# Patient Record
Sex: MALE | URBAN - METROPOLITAN AREA
[De-identification: names, ages, dates, MRNs, and addresses within clinical notes are randomized per-mention and may not be internally consistent; named-entity substitution may affect disease eponyms.]

---

## 2022-11-20 ENCOUNTER — INPATIENT (INPATIENT)
Facility: HOSPITAL | Age: 87
LOS: 2 days | DRG: 82 | End: 2022-11-23
Attending: INTERNAL MEDICINE | Admitting: INTERNAL MEDICINE
Payer: MEDICARE

## 2022-11-20 VITALS — OXYGEN SATURATION: 98 % | HEART RATE: 104 BPM | RESPIRATION RATE: 24 BRPM | TEMPERATURE: 99 F | WEIGHT: 148.37 LBS

## 2022-11-20 DIAGNOSIS — I72.9 ANEURYSM OF UNSPECIFIED SITE: ICD-10-CM

## 2022-11-20 LAB
ALBUMIN SERPL ELPH-MCNC: 3.4 G/DL — SIGNIFICANT CHANGE UP (ref 3.3–5)
ALP SERPL-CCNC: 97 U/L — SIGNIFICANT CHANGE UP (ref 40–120)
ALT FLD-CCNC: 72 U/L — HIGH (ref 10–45)
ANION GAP SERPL CALC-SCNC: 11 MMOL/L — SIGNIFICANT CHANGE UP (ref 5–17)
APTT BLD: 26.4 SEC — LOW (ref 27.5–35.5)
AST SERPL-CCNC: 91 U/L — HIGH (ref 10–40)
BILIRUB SERPL-MCNC: 0.6 MG/DL — SIGNIFICANT CHANGE UP (ref 0.2–1.2)
BLD GP AB SCN SERPL QL: NEGATIVE — SIGNIFICANT CHANGE UP
BUN SERPL-MCNC: 30 MG/DL — HIGH (ref 7–23)
CALCIUM SERPL-MCNC: 7.7 MG/DL — LOW (ref 8.4–10.5)
CHLORIDE SERPL-SCNC: 101 MMOL/L — SIGNIFICANT CHANGE UP (ref 96–108)
CHOLEST SERPL-MCNC: 125 MG/DL — SIGNIFICANT CHANGE UP
CO2 SERPL-SCNC: 20 MMOL/L — LOW (ref 22–31)
CREAT SERPL-MCNC: 1.38 MG/DL — HIGH (ref 0.5–1.3)
EGFR: 40 ML/MIN/1.73M2 — LOW
GAS PNL BLDA: SIGNIFICANT CHANGE UP
GLUCOSE SERPL-MCNC: 184 MG/DL — HIGH (ref 70–99)
HCT VFR BLD CALC: 37 % — LOW (ref 39–50)
HDLC SERPL-MCNC: 67 MG/DL — SIGNIFICANT CHANGE UP
HGB BLD-MCNC: 12 G/DL — LOW (ref 13–17)
INR BLD: 1.1 RATIO — SIGNIFICANT CHANGE UP (ref 0.88–1.16)
LIPID PNL WITH DIRECT LDL SERPL: 50 MG/DL — SIGNIFICANT CHANGE UP
MAGNESIUM SERPL-MCNC: 1.9 MG/DL — SIGNIFICANT CHANGE UP (ref 1.6–2.6)
MCHC RBC-ENTMCNC: 29.5 PG — SIGNIFICANT CHANGE UP (ref 27–34)
MCHC RBC-ENTMCNC: 32.4 GM/DL — SIGNIFICANT CHANGE UP (ref 32–36)
MCV RBC AUTO: 90.9 FL — SIGNIFICANT CHANGE UP (ref 80–100)
NON HDL CHOLESTEROL: 58 MG/DL — SIGNIFICANT CHANGE UP
NRBC # BLD: 0 /100 WBCS — SIGNIFICANT CHANGE UP (ref 0–0)
PHOSPHATE SERPL-MCNC: 2.8 MG/DL — SIGNIFICANT CHANGE UP (ref 2.5–4.5)
PLATELET # BLD AUTO: 191 K/UL — SIGNIFICANT CHANGE UP (ref 150–400)
POTASSIUM SERPL-MCNC: 4.5 MMOL/L — SIGNIFICANT CHANGE UP (ref 3.5–5.3)
POTASSIUM SERPL-SCNC: 4.5 MMOL/L — SIGNIFICANT CHANGE UP (ref 3.5–5.3)
PROT SERPL-MCNC: 6 G/DL — SIGNIFICANT CHANGE UP (ref 6–8.3)
PROTHROM AB SERPL-ACNC: 12.7 SEC — SIGNIFICANT CHANGE UP (ref 10.5–13.4)
RBC # BLD: 4.07 M/UL — LOW (ref 4.2–5.8)
RBC # FLD: 14.4 % — SIGNIFICANT CHANGE UP (ref 10.3–14.5)
RH IG SCN BLD-IMP: POSITIVE — SIGNIFICANT CHANGE UP
SODIUM SERPL-SCNC: 132 MMOL/L — LOW (ref 135–145)
TRIGL SERPL-MCNC: 43 MG/DL — SIGNIFICANT CHANGE UP
TROPONIN T, HIGH SENSITIVITY RESULT: 238 NG/L — HIGH (ref 0–51)
WBC # BLD: 17.73 K/UL — HIGH (ref 3.8–10.5)
WBC # FLD AUTO: 17.73 K/UL — HIGH (ref 3.8–10.5)

## 2022-11-20 PROCEDURE — 71045 X-RAY EXAM CHEST 1 VIEW: CPT | Mod: 26

## 2022-11-20 PROCEDURE — 99291 CRITICAL CARE FIRST HOUR: CPT

## 2022-11-20 PROCEDURE — 93010 ELECTROCARDIOGRAM REPORT: CPT

## 2022-11-20 RX ORDER — CHLORHEXIDINE GLUCONATE 213 G/1000ML
1 SOLUTION TOPICAL AT BEDTIME
Refills: 0 | Status: DISCONTINUED | OUTPATIENT
Start: 2022-11-20 | End: 2022-11-23

## 2022-11-20 RX ORDER — SODIUM CHLORIDE 9 MG/ML
1000 INJECTION INTRAMUSCULAR; INTRAVENOUS; SUBCUTANEOUS
Refills: 0 | Status: DISCONTINUED | OUTPATIENT
Start: 2022-11-20 | End: 2022-11-22

## 2022-11-20 RX ORDER — CHLORHEXIDINE GLUCONATE 213 G/1000ML
15 SOLUTION TOPICAL EVERY 12 HOURS
Refills: 0 | Status: DISCONTINUED | OUTPATIENT
Start: 2022-11-20 | End: 2022-11-23

## 2022-11-20 RX ORDER — LACOSAMIDE 50 MG/1
50 TABLET ORAL EVERY 12 HOURS
Refills: 0 | Status: DISCONTINUED | OUTPATIENT
Start: 2022-11-20 | End: 2022-11-21

## 2022-11-20 RX ORDER — PANTOPRAZOLE SODIUM 20 MG/1
40 TABLET, DELAYED RELEASE ORAL DAILY
Refills: 0 | Status: DISCONTINUED | OUTPATIENT
Start: 2022-11-20 | End: 2022-11-23

## 2022-11-20 RX ORDER — PHENYLEPHRINE HYDROCHLORIDE 10 MG/ML
0.2 INJECTION INTRAVENOUS
Qty: 40 | Refills: 0 | Status: DISCONTINUED | OUTPATIENT
Start: 2022-11-20 | End: 2022-11-21

## 2022-11-20 RX ADMIN — PANTOPRAZOLE SODIUM 40 MILLIGRAM(S): 20 TABLET, DELAYED RELEASE ORAL at 23:19

## 2022-11-20 RX ADMIN — PHENYLEPHRINE HYDROCHLORIDE 5.05 MICROGRAM(S)/KG/MIN: 10 INJECTION INTRAVENOUS at 23:20

## 2022-11-20 NOTE — CONSULT NOTE ADULT - SUBJECTIVE AND OBJECTIVE BOX
p (1480)     HPI: 80 male with a past medical history of hypothyroidism (no AC/AP) s/p a fall down stairs today, was intubated in field for unresponsiveness, transferred from [facility] after CTH showed subarachnoid hemorrhage of unknown (trauma vs. aneurysmal). Upon transfer he was on propofol for sedation.     Imaging: [facility] diffuse subarachnoid hemorrhage, no significant hydrocephalus, multiple skull and orbit fractures R>L w/ displacement and intracranial pneumocephalus.     Exam: Intubated, no EO, L pupil 4R, R pupil 3NR, no corneals,+cough/gag, not FC< spont x4    --Anticoagulation:    =====================  PAST MEDICAL HISTORY     PAST SURGICAL HISTORY         MEDICATIONS:  Antibiotics:    Neuro:  lacosamide IVPB 50 milliGRAM(s) IV Intermittent every 12 hours    Other:  pantoprazole  Injectable 40 milliGRAM(s) IV Push daily      SOCIAL HISTORY:   Occupation:   Marital Status:     FAMILY HISTORY:      ROS: Negative except per HPI    LABS:  PT/INR - ( 20 Nov 2022 21:43 )   PT: 12.7 sec;   INR: 1.10 ratio         PTT - ( 20 Nov 2022 21:43 )  PTT:26.4 sec                        12.0   17.73 )-----------( 191      ( 20 Nov 2022 21:43 )             37.0     11-20    132<L>  |  101  |  30<H>  ----------------------------<  184<H>  4.5   |  20<L>  |  1.38<H>    Ca    7.7<L>      20 Nov 2022 21:43  Phos  2.8     11-20  Mg     1.9     11-20    TPro  6.0  /  Alb  3.4  /  TBili  0.6  /  DBili  x   /  AST  91<H>  /  ALT  72<H>  /  AlkPhos  97  11-20

## 2022-11-20 NOTE — CONSULT NOTE ADULT - SUBJECTIVE AND OBJECTIVE BOX
TRAUMA SERVICE (Acute Care Surgery / ACS - #9063) - CONSULT NOTE  --------------------------------------------------------------------------------------------    TRAUMA ACTIVATION LEVEL:     MECHANISM OF INJURY:      [] Blunt  	[] MVC	[X] Fall	[] Pedestrian Struck	[] Motorcycle accident      [] Penetrating  	[] Gun Shot Wound 		[] Stab Wound    GCS: 7T          HPI:  89yo M found at the bottom of stairs (reportedly fall down 12 stairs), patient not on any anticoagulants/antiplatelets, brought to [facility], intubated for unresponsiveness, reportedly as SAH on CTH, neurosurgeon and radiologist concerned for aneurysmal SAH pattern, requested transfer to NSCU from [facility]. Information obtained by daughter Brianda ([phone]). Reportedly per EMS, in [facility] ED, received 1U pRBC and 60g mannitol. Patient on propofol for sedation.    Primary Survey:   A - Intubated  B - bilateral breath sounds, mechanical ventilation 18/5/450/40%  C - initial BP: 95/67, off pressors  D - GCS 7T on arrival  Exposure obtained      Secondary Survey:   General: NAD  HEENT:  b/l periorbital bruising, in C collar  Neck: Soft, midline trachea, C-collar in place, deformity over L clavicle  Chest: Deformity over L lower chest/anterior ribs  Cardiac: Regular rate  Respiratory: bilateral breath sounds, mechanical ventilation 18/5/450/40%  Abdomen: Soft, non-distended, non-tender, no rebound, no guarding   Pelvis: Stable, non-tender, no ecchymosis  Ext: Palpable pulses b/l    ROS: 10-system review is otherwise negative except HPI above.      PAST MEDICAL & SURGICAL HISTORY:  Hypothyroidism  Afib (No Anticoagulants/No antiplatelets        FAMILY HISTORY:        ALLERGIES: No Known Allergies      HOME MEDICATIONS: Pending Med rec    CURRENT MEDICATIONS  MEDICATIONS (STANDING): lacosamide IVPB 50 milliGRAM(s) IV Intermittent every 12 hours  pantoprazole  Injectable 40 milliGRAM(s) IV Push daily  phenylephrine    Infusion 0.2 MICROgram(s)/kG/Min IV Continuous <Continuous>  sodium chloride 0.9%. 1000 milliLiter(s) IV Continuous <Continuous>    MEDICATIONS (PRN):  --------------------------------------------------------------------------------------------     Vital Signs Last 24 Hrs  T(C): 37.1 (20 Nov 2022 20:53), Max: 37.1 (20 Nov 2022 20:53)  T(F): 98.8 (20 Nov 2022 20:53), Max: 98.8 (20 Nov 2022 20:53)  HR: 98 (20 Nov 2022 22:30) (98 - 110)  BP: 95/67 (20 Nov 2022 22:15) (84/59 - 123/74)  BP(mean): 77 (20 Nov 2022 22:15) (69 - 90)  RR: 20 (20 Nov 2022 22:30) (12 - 27)  SpO2: 100% (20 Nov 2022 22:30) (96% - 100%)    Parameters below as of 20 Nov 2022 20:53  Patient On (Oxygen Delivery Method): ventilator    O2 Concentration (%): 80      CAPILLARY BLOOD GLUCOSE      Vital Signs Last 24 Hrs  T(C): 37.1 (20 Nov 2022 20:53), Max: 37.1 (20 Nov 2022 20:53)  T(F): 98.8 (20 Nov 2022 20:53), Max: 98.8 (20 Nov 2022 20:53)  HR: 98 (20 Nov 2022 22:30) (98 - 110)  BP: 95/67 (20 Nov 2022 22:15) (84/59 - 123/74)  BP(mean): 77 (20 Nov 2022 22:15) (69 - 90)  RR: 20 (20 Nov 2022 22:30) (12 - 27)  SpO2: 100% (20 Nov 2022 22:30) (96% - 100%)    Parameters below as of 20 Nov 2022 20:53  Patient On (Oxygen Delivery Method): ventilator    O2 Concentration (%): 80    Weight (kg): 67.3 (11-20 @ 20:53)    PHYSICAL EXAM:  General: NAD  HEENT:  b/l periorbital bruising, in C collar  Neck: Soft, midline trachea, C-collar in place, deformity over L clavicle  Chest: Deformity over L lower chest/anterior ribs  Cardiac: Regular rate  Respiratory: bilateral breath sounds, mechanical ventilation 18/5/450/40%  Abdomen: Soft, non-distended, non-tender, no rebound, no guarding   Pelvis: Stable, non-tender, no ecchymosis  Ext: Palpable pulses b/l    --------------------------------------------------------------------------------------------    LABS  CBC (11-20 @ 21:43)                              12.0<L>                         17.73<H>  )----------------(  191        --    % Neutrophils, --    % Lymphocytes, ANC: --                                  37.0<L>    BMP (11-20 @ 21:43)             132<L>  |  101     |  30<H> 		Ca++ --      Ca 7.7<L>             ---------------------------------( 184<H>		Mg 1.9                4.5     |  20<L>   |  1.38<H>			Ph 2.8       LFTs (11-20 @ 21:43)      TPro 6.0 / Alb 3.4 / TBili 0.6 / DBili -- / AST 91<H> / ALT 72<H> / AlkPhos 97    Coags (11-20 @ 21:43)  aPTT 26.4<L> / INR 1.10 / PT 12.7          --------------------------------------------------------------------------------------------    MICROBIOLOGY      --------------------------------------------------------------------------------------------    IMAGING  Pending    --------------------------------------------------------------------------------------------     TRAUMA SERVICE (Acute Care Surgery / [facility] - #9074) - CONSULT NOTE  --------------------------------------------------------------------------------------------    TRAUMA ACTIVATION LEVEL:     MECHANISM OF INJURY:      [] Blunt  	[] MVC	[X] Fall	[] Pedestrian Struck	[] Motorcycle accident      [] Penetrating  	[] Gun Shot Wound 		[] Stab Wound    GCS: 7T          HPI:  89yo M found at the bottom of stairs (reportedly fall down 12 stairs), patient not on any anticoagulants/antiplatelets, brought to [facility], intubated for unresponsiveness, reportedly as SAH on CTH, neurosurgeon and radiologist concerned for aneurysmal SAH pattern, requested transfer to NSCU from [facility]. Information obtained by daughter Brianda ([phone]). Reportedly per EMS, in [facility] ED, received 1U pRBC and 60g mannitol. Patient on propofol for sedation.    Primary Survey:   A - Intubated  B - bilateral breath sounds, mechanical ventilation 18/5/450/40%  C - initial BP: 95/67, off pressors  D - GCS 7T on arrival  Exposure obtained      Secondary Survey:   General: NAD  HEENT:  b/l periorbital bruising, in C collar  Neck: Soft, midline trachea, C-collar in place, deformity over L clavicle  Chest: Deformity over L lower chest/anterior ribs  Cardiac: Regular rate  Respiratory: bilateral breath sounds, mechanical ventilation 18/5/450/40%  Abdomen: Soft, non-distended, non-tender, no rebound, no guarding   Pelvis: Stable, non-tender, no ecchymosis  Ext: Palpable pulses b/l    ROS: 10-system review is otherwise negative except HPI above.      PAST MEDICAL & SURGICAL HISTORY:  Hypothyroidism  Afib (No Anticoagulants/No antiplatelets        FAMILY HISTORY:        ALLERGIES: No Known Allergies      HOME MEDICATIONS: Pending Med rec    CURRENT MEDICATIONS  MEDICATIONS (STANDING): lacosamide IVPB 50 milliGRAM(s) IV Intermittent every 12 hours  pantoprazole  Injectable 40 milliGRAM(s) IV Push daily  phenylephrine    Infusion 0.2 MICROgram(s)/kG/Min IV Continuous <Continuous>  sodium chloride 0.9%. 1000 milliLiter(s) IV Continuous <Continuous>    MEDICATIONS (PRN):  --------------------------------------------------------------------------------------------     Vital Signs Last 24 Hrs  T(C): 37.1 (20 Nov 2022 20:53), Max: 37.1 (20 Nov 2022 20:53)  T(F): 98.8 (20 Nov 2022 20:53), Max: 98.8 (20 Nov 2022 20:53)  HR: 98 (20 Nov 2022 22:30) (98 - 110)  BP: 95/67 (20 Nov 2022 22:15) (84/59 - 123/74)  BP(mean): 77 (20 Nov 2022 22:15) (69 - 90)  RR: 20 (20 Nov 2022 22:30) (12 - 27)  SpO2: 100% (20 Nov 2022 22:30) (96% - 100%)    Parameters below as of 20 Nov 2022 20:53  Patient On (Oxygen Delivery Method): ventilator    O2 Concentration (%): 80      CAPILLARY BLOOD GLUCOSE      Vital Signs Last 24 Hrs  T(C): 37.1 (20 Nov 2022 20:53), Max: 37.1 (20 Nov 2022 20:53)  T(F): 98.8 (20 Nov 2022 20:53), Max: 98.8 (20 Nov 2022 20:53)  HR: 98 (20 Nov 2022 22:30) (98 - 110)  BP: 95/67 (20 Nov 2022 22:15) (84/59 - 123/74)  BP(mean): 77 (20 Nov 2022 22:15) (69 - 90)  RR: 20 (20 Nov 2022 22:30) (12 - 27)  SpO2: 100% (20 Nov 2022 22:30) (96% - 100%)    Parameters below as of 20 Nov 2022 20:53  Patient On (Oxygen Delivery Method): ventilator    O2 Concentration (%): 80    Weight (kg): 67.3 (11-20 @ 20:53)    PHYSICAL EXAM:  General: NAD  HEENT:  b/l periorbital bruising, in C collar  Neck: Soft, midline trachea, C-collar in place, deformity over L clavicle  Chest: Deformity over L lower chest/anterior ribs  Cardiac: Regular rate  Respiratory: bilateral breath sounds, mechanical ventilation 18/5/450/40%  Abdomen: Soft, non-distended, non-tender, no rebound, no guarding   Pelvis: Stable, non-tender, no ecchymosis  Ext: Palpable pulses b/l    --------------------------------------------------------------------------------------------    LABS  CBC (11-20 @ 21:43)                              12.0<L>                         17.73<H>  )----------------(  191        --    % Neutrophils, --    % Lymphocytes, ANC: --                                  37.0<L>    BMP (11-20 @ 21:43)             132<L>  |  101     |  30<H> 		Ca++ --      Ca 7.7<L>             ---------------------------------( 184<H>		Mg 1.9                4.5     |  20<L>   |  1.38<H>			Ph 2.8       LFTs (11-20 @ 21:43)      TPro 6.0 / Alb 3.4 / TBili 0.6 / DBili -- / AST 91<H> / ALT 72<H> / AlkPhos 97    Coags (11-20 @ 21:43)  aPTT 26.4<L> / INR 1.10 / PT 12.7          --------------------------------------------------------------------------------------------    MICROBIOLOGY      --------------------------------------------------------------------------------------------    IMAGING    ******PRELIMINARY REPORT******      ******PRELIMINARY REPORT******       ACC: [id number] EXAM:  CT CHEST IC                          PROCEDURE DATE:  11/21/2022    ******PRELIMINARY REPORT******      ******PRELIMINARY REPORT******           INTERPRETATION:  No emergent findings in the chest, abd, pelvis. Follow   up full report in AM        ******PRELIMINARY REPORT******      ******PRELIMINARY REPORT******        SUZANNE SOARES MD; Resident Radiologist  This document is a PRELIMINARY interpretation and is pending final   attending approval. Nov 21 2022  4:19AM    --------------------------------------------------------------------------------------------

## 2022-11-20 NOTE — PROGRESS NOTE ADULT - ASSESSMENT
ASSESSMENT/PLAN:    NEURO:  Activity: [] mobilize as tolerated [] Bedrest [] PT [] OT [] PMNR    PULM:    CV:  SBP goal    RENAL:  Fluids:    GI:  Diet:  GI prophylaxis [] not indicated [] PPI [] other:  Bowel regimen [] colace [] senna [] other:    ENDO:   Goal euglycemia (-180)    HEME/ONC:  VTE prophylaxis: [] SCDs [] chemoprophylaxis [] hold chemoprophylaxis due to: [] high risk of DVT/PE on admission due to:    ID:    MISC:    SOCIAL/FAMILY:  [] awaiting [] updated at bedside [] family meeting    CODE STATUS:  [] Full Code [] DNR [] DNI [] Palliative/Comfort Care    DISPOSITION:  [] ICU [] Stroke Unit [] Floor [] EMU [] RCU [] PCU    [] Patient is at high risk of neurologic deterioration/death due to:     Time seen:  Time spent: ___ [] critical care minutes    Contact: [phone] ASSESSMENT/PLAN: presumed trauma, found at the bottom of stairs    NEURO:  needs repeat CTH/CTA now once renal function back  vimpat 50mg bid for seizure ppx  CT Cspine  C collar at all times for now  Activity: [] mobilize as tolerated [x] Bedrest [] PT [] OT [] PMNR    PULM:  intubated, CXR, ABG  18/450/5/40%    CV:  SBP goal 100-160 for now, need f/u scans to assess for traumatic SAH vs. aneurysmal   EKG LBBB  TTE pending  check trops     RENAL:  Fluids: IVF     GI:  Diet: NPO   GI prophylaxis [] not indicated [x] PPI [] other:  Bowel regimen [] colace [x] senna [x] other: miraalx     ENDO:   Goal euglycemia (-180)  TFT  reportedly hx hypothyroid, will need to resume synthroid     HEME/ONC:  VTE prophylaxis: [x] SCDs [] chemoprophylaxis [x] hold chemoprophylaxis due to:  SAH [] high risk of DVT/PE on admission due to:    ID: monitor for fevers     MISC: trauma consult, CT C/A/P for eval ?reported fall down the stairs    SOCIAL/FAMILY:  [x] awaiting [] updated at bedside [] family meeting    CODE STATUS:  [x] Full Code [] DNR [] DNI [] Palliative/Comfort Care    DISPOSITION:  [x] ICU [] Stroke Unit [] Floor [] EMU [] RCU [] PCU    [x] Patient is at high risk of neurologic deterioration/death due to: respiratory failure requiring intubation, SAH     Time spent: 50 critical care minutes    Contact: [phone] ASSESSMENT/PLAN: presumed trauma, found at the bottom of stairs    NEURO:  needs repeat CTH/CTA now once renal function back  vimpat 50mg bid for seizure ppx  CT Cspine  C collar at all times for now  Activity: [] mobilize as tolerated [x] Bedrest [] PT [] OT [] PMNR    PULM:  intubated, CXR, ABG  18/450/5/40%    CV:  SBP goal 100-140 for now, need f/u scans to assess for traumatic SAH vs. aneurysmal   EKG LBBB  TTE pending  check trops     RENAL:  Fluids: IVF     GI:  Diet: NPO   GI prophylaxis [] not indicated [x] PPI [] other:  Bowel regimen [] colace [x] senna [x] other: miraalx     ENDO:   Goal euglycemia (-180)  TFT  reportedly hx hypothyroid, will need to resume synthroid     HEME/ONC:  VTE prophylaxis: [x] SCDs [] chemoprophylaxis [x] hold chemoprophylaxis due to:  SAH [] high risk of DVT/PE on admission due to:    ID: monitor for fevers     MISC: trauma consult, CT C/A/P for eval ?reported fall down the stairs    SOCIAL/FAMILY:  [x] awaiting [] updated at bedside [] family meeting    CODE STATUS:  [x] Full Code [] DNR [] DNI [] Palliative/Comfort Care    DISPOSITION:  [x] ICU [] Stroke Unit [] Floor [] EMU [] RCU [] PCU    [x] Patient is at high risk of neurologic deterioration/death due to: respiratory failure requiring intubation, SAH     Time spent: 50 critical care minutes    Contact: [phone] ASSESSMENT/PLAN: presumed trauma, found at the bottom of stairs    NEURO:  needs repeat CTH/CTA now once renal function back  vimpat 50mg bid for seizure ppx  CT Cspine  C collar at all times for now  will need vEEG once scans done   Activity: [] mobilize as tolerated [x] Bedrest [] PT [] OT [] PMNR    PULM:  intubated, CXR, ABG  18/450/5/40%    CV:  SBP goal 100-140 for now, need f/u scans to assess for traumatic SAH vs. aneurysmal   EKG LBBB  TTE pending  check trops     RENAL:  Fluids: IVF     GI:  Diet: NPO   GI prophylaxis [] not indicated [x] PPI [] other:  Bowel regimen [] colace [x] senna [x] other: miraalx     ENDO:   Goal euglycemia (-180)  TFT  reportedly hx hypothyroid, will need to resume synthroid     HEME/ONC:  VTE prophylaxis: [x] SCDs [] chemoprophylaxis [x] hold chemoprophylaxis due to:  SAH [] high risk of DVT/PE on admission due to:    ID: monitor for fevers     MISC: trauma consult, CT C/A/P for eval ?reported fall down the stairs    SOCIAL/FAMILY:  [x] awaiting [] updated at bedside [] family meeting    CODE STATUS:  [x] Full Code [] DNR [] DNI [] Palliative/Comfort Care    DISPOSITION:  [x] ICU [] Stroke Unit [] Floor [] EMU [] RCU [] PCU    [x] Patient is at high risk of neurologic deterioration/death due to: respiratory failure requiring intubation, SAH     Time spent: 50 critical care minutes    Contact: [phone]

## 2022-11-20 NOTE — CONSULT NOTE ADULT - ASSESSMENT
89yo M found at the bottom of stairs (reportedly fall down 12 stairs), patient not on any anticoagulants/antiplatelets, brought to [facility], intubated for unresponsiveness, reportedly as SAH on CTH, neurosurgeon and radiologist concerned for aneurysmal SAH pattern, requested transfer to NSCU from [facility]. Information obtained by daughter Brianda ([phone]). Reportedly per EMS, in [facility] ED, received 1U pRBC and 60g mannitol. Patient on propofol for sedation.      Plan  - CTA scan Head/Neck and CT chest/abdomen/pelvis pending  - Trend H/H, transfuse as needed  - Monitor vital signs  - Will follow  - Discussed with senior resident    ACS Surgery  [id number]    89yo M found at the bottom of stairs (reportedly fall down 12 stairs), patient not on any anticoagulants/antiplatelets, brought to [facility], intubated for unresponsiveness, reportedly as SAH on CTH, neurosurgeon and radiologist concerned for aneurysmal SAH pattern, requested transfer to NSCU from [facility]. Information obtained by daughter Brianda ([phone]). Reportedly per EMS, in [facility] ED, received 1U pRBC and 60g mannitol. Patient on propofol for sedation.      Plan  - CT scan Head noncon/ CT angio head/ CT angio Neck and CT chest/abdomen/pelvis pending  - Trend H/H, transfuse as needed  - Monitor vital signs  - Will follow  - Discussed with Surgery Attending on call Dr. Miller    ACS Surgery  [id number]    89yo M found at the bottom of stairs (reportedly fall down 12 stairs), patient not on any anticoagulants/antiplatelets, brought to [facility], intubated for unresponsiveness, reportedly as SAH on CTH, neurosurgeon and radiologist concerned for aneurysmal SAH pattern, requested transfer to NSCU from [facility]. Information obtained by daughter Brianda ([phone]). Reportedly per EMS, in [facility] ED, received 1U pRBC and 60g mannitol. Patient on propofol for sedation.      Plan  - No acute surgical intervention  - CT scan Head noncon/ CT angio head/ CT angio Neck and CT chest/abdomen/pelvis f/u final read, prelim report without emergent findings from chest/abdomen/pelvis  - Neurosurgery recommendations appreciated, per NSG family elected not to proceed with ICP monitor/drain according to previous living will despite neuro exam changes (dilated fixed pupils)  - f/u C discussion  - Trend H/H, transfuse as needed  - Monitor vital signs  - Will follow  - Discussed with senior resident and with Surgery Attending on call Dr. Miller    ACS Surgery  [id number]

## 2022-11-20 NOTE — PATIENT PROFILE ADULT - VISION (WITH CORRECTIVE LENSES IF THE PATIENT USUALLY WEARS THEM):
Vaccine Information Sheet, \"Influenza - Inactivated\"  given to Martha Bradshaw, or parent/legal guardian of  Martha Bradshaw and verbalized understanding. Patient responses:    Have you ever had a reaction to a flu vaccine? No  Do you have any current illness? No  Have you ever had [place]illian [place] Syndrome? No  Do you have a serious allergy to any of the follow: Neomycin, Polymyxin, Thimerosal, eggs or egg products? No    Flu vaccine given per order. Please see immunization tab. Risks and benefits explained. Current VIS given. Normal vision: sees adequately in most situations; can see medication labels, newsprint

## 2022-11-20 NOTE — CONSULT NOTE ADULT - ASSESSMENT
80M pmhx hypothyroid s/p fall down stairs no ac/ap, intubated in field, xferred from [facility]. CTH 5pm traumatic vs aneurysmal SAH. Weaning off propofol, pupils 3NR b/l, no corneals, + cough/gag, spont x4  -in nscu, admitted to neurointensivist  -repeat CTH w/ CTA pending  -will discuss goals of care with family. If no improvement in exam with propofol held will likely require intracranial pressure monitoring for trauma guidelines

## 2022-11-20 NOTE — PROGRESS NOTE ADULT - SUBJECTIVE AND OBJECTIVE BOX
SUMMARY:    OVERNIGHT EVENTS:     ADMISSION SCORES:   GCS: HH: MF: NIHSS: ICH Score:    SEDATION SCORES:  RASS: CAM-ICU:     REVIEW OF SYSTEMS:    VITALS: [] Reviewed    IMAGING/DATA: [] Reviewed    IVF FLUIDS/MEDICATIONS: [] Reviewed    ALLERGIES: Allergies      Intolerances        DEVICES:   [] Restraints [] PIVs [] ET tube [] central line [] PICC [] arterial line [] nash [] NGT/OGT [] EVD [] LD [] JAYDON/HMV [] LiCOX [] ICP monitor [] Trach [] PEG [] Chest Tube [] other:    EXAMINATION:  General: No acute distress  HEENT: Anicteric sclerae  Cardiac: [id number]  Lungs: Clear  Abdomen: Soft, non-tender, +BS  Extremities: No c/c/e  Skin/Incision Site: Clean, dry and intact  Neurologic: Awake, alert, fully oriented, follows commands, PERRL, VFFtc, EOMI, face symmetric, tongue midline, no drift, full strength SUMMARY: Reportedly 89yo man found at the bottom of stairs, brought to [facility], intubated, reportedly as SAH on CTH, neurosurgeon and radiologist concerned for aneurysmal SAH pattern, requested transfer. Daughter on her way to [facility] now, Brianda [phone]. Reportedly per EMS, in [facility] ED, received 1U pRBC and 60g mannitol.     ADMISSION SCORES: needs CTH, was sent with no written reports   GCS: 7T HH: MF: NIHSS: ICH Score:    REVIEW OF SYSTEMS: [x] Unable to Assess due to neurologic exam   [ ] All ROS addressed below are non-contributory, except:  Neuro: [ ] Headache [ ] Back pain [ ] Numbness [ ] Weakness [ ] Ataxia [ ] Dizziness [ ] Aphasia [ ] Dysarthria [ ] Visual disturbance  Resp: [ ] Shortness of breath/dyspnea [ ] Orthopnea [ ] Cough  CV: [ ] Chest pain [ ] Palpitation [ ] Lightheadedness [ ] Syncope  Renal: [ ] Thirst [ ] Edema  GI: [ ] Nausea [ ] Emesis [ ] Abdominal pain [ ] Constipation [ ] Diarrhea  Hem: [ ] Hematemesis [ ] bBright red blood per rectum  ID: [ ] Fever [ ] Chills [ ] Dysuria  ENT: [ ] Rhinorrhea    VITALS: [x] Reviewed    IMAGING/DATA: [x] Reviewed    IVF FLUIDS/MEDICATIONS: [x] Reviewed    ALLERGIES: Allergies    Intolerances    DEVICES:   [] Restraints [x] PIVs [x] ET tube [] central line [] PICC [x] arterial line [x] nash [x] NGT/OGT [] EVD [] LD [] JAYDON/HMV [] LiCOX [] ICP monitor [] Trach [] PEG [] Chest Tube [] other:    EXAMINATION:  General: No acute distress  HEENT: Anicteric sclerae  Cardiac: [id number]  Lungs: Clear  Abdomen: Soft, non-tender, +BS  Extremities: No c/c/e  Neurologic: b/l periorbital bruising, in C collar, pupils 3mm NR, no EO to stim, RUE spont/AG, LUE no movement, RLE AG to stim, LLE no movement  SUMMARY: Reportedly 89yo man found at the bottom of stairs, brought to [facility], intubated, reportedly as SAH on CTH, neurosurgeon and radiologist concerned for aneurysmal SAH pattern, requested transfer. Daughter on her way to [facility] now, Brianda [phone]. Reportedly per EMS, in [facility] ED, received 1U pRBC and 60g mannitol.     ADMISSION SCORES: needs CTH, was sent with no written reports   GCS: 7T HH: 4 MF: 4 NIHSS: ICH Score:    REVIEW OF SYSTEMS: [x] Unable to Assess due to neurologic exam   [ ] All ROS addressed below are non-contributory, except:  Neuro: [ ] Headache [ ] Back pain [ ] Numbness [ ] Weakness [ ] Ataxia [ ] Dizziness [ ] Aphasia [ ] Dysarthria [ ] Visual disturbance  Resp: [ ] Shortness of breath/dyspnea [ ] Orthopnea [ ] Cough  CV: [ ] Chest pain [ ] Palpitation [ ] Lightheadedness [ ] Syncope  Renal: [ ] Thirst [ ] Edema  GI: [ ] Nausea [ ] Emesis [ ] Abdominal pain [ ] Constipation [ ] Diarrhea  Hem: [ ] Hematemesis [ ] bBright red blood per rectum  ID: [ ] Fever [ ] Chills [ ] Dysuria  ENT: [ ] Rhinorrhea    VITALS: [x] Reviewed    IMAGING/DATA: [x] Reviewed    IVF FLUIDS/MEDICATIONS: [x] Reviewed    ALLERGIES: Allergies    Intolerances    DEVICES:   [] Restraints [x] PIVs [x] ET tube [] central line [] PICC [x] arterial line [x] nash [x] NGT/OGT [] EVD [] LD [] JAYDON/HMV [] LiCOX [] ICP monitor [] Trach [] PEG [] Chest Tube [] other:    EXAMINATION:  General: No acute distress  HEENT: Anicteric sclerae  Cardiac: [id number]  Lungs: Clear  Abdomen: Soft, non-tender, +BS  Extremities: No c/c/e  Neurologic: b/l periorbital bruising, in C collar, pupils 3mm NR, no EO to stim, RUE spont/AG, LUE no movement, RLE AG to stim, LLE no movement

## 2022-11-21 DIAGNOSIS — G93.40 ENCEPHALOPATHY, UNSPECIFIED: ICD-10-CM

## 2022-11-21 DIAGNOSIS — R53.2 FUNCTIONAL QUADRIPLEGIA: ICD-10-CM

## 2022-11-21 DIAGNOSIS — J96.01 ACUTE RESPIRATORY FAILURE WITH HYPOXIA: ICD-10-CM

## 2022-11-21 DIAGNOSIS — I60.9 NONTRAUMATIC SUBARACHNOID HEMORRHAGE, UNSPECIFIED: ICD-10-CM

## 2022-11-21 DIAGNOSIS — Z71.89 OTHER SPECIFIED COUNSELING: ICD-10-CM

## 2022-11-21 DIAGNOSIS — R09.89 OTHER SPECIFIED SYMPTOMS AND SIGNS INVOLVING THE CIRCULATORY AND RESPIRATORY SYSTEMS: ICD-10-CM

## 2022-11-21 LAB
A1C WITH ESTIMATED AVERAGE GLUCOSE RESULT: 6.2 % — HIGH (ref 4–5.6)
ANION GAP SERPL CALC-SCNC: 10 MMOL/L — SIGNIFICANT CHANGE UP (ref 5–17)
ANION GAP SERPL CALC-SCNC: 11 MMOL/L — SIGNIFICANT CHANGE UP (ref 5–17)
ANION GAP SERPL CALC-SCNC: 9 MMOL/L — SIGNIFICANT CHANGE UP (ref 5–17)
BUN SERPL-MCNC: 30 MG/DL — HIGH (ref 7–23)
BUN SERPL-MCNC: 35 MG/DL — HIGH (ref 7–23)
BUN SERPL-MCNC: 37 MG/DL — HIGH (ref 7–23)
CALCIUM SERPL-MCNC: 8 MG/DL — LOW (ref 8.4–10.5)
CALCIUM SERPL-MCNC: 8.4 MG/DL — SIGNIFICANT CHANGE UP (ref 8.4–10.5)
CALCIUM SERPL-MCNC: 8.6 MG/DL — SIGNIFICANT CHANGE UP (ref 8.4–10.5)
CHLORIDE SERPL-SCNC: 109 MMOL/L — HIGH (ref 96–108)
CHLORIDE SERPL-SCNC: 116 MMOL/L — HIGH (ref 96–108)
CHLORIDE SERPL-SCNC: 120 MMOL/L — HIGH (ref 96–108)
CO2 SERPL-SCNC: 19 MMOL/L — LOW (ref 22–31)
CO2 SERPL-SCNC: 21 MMOL/L — LOW (ref 22–31)
CO2 SERPL-SCNC: 23 MMOL/L — SIGNIFICANT CHANGE UP (ref 22–31)
CREAT SERPL-MCNC: 1.48 MG/DL — HIGH (ref 0.5–1.3)
CREAT SERPL-MCNC: 1.99 MG/DL — HIGH (ref 0.5–1.3)
CREAT SERPL-MCNC: 2.1 MG/DL — HIGH (ref 0.5–1.3)
EGFR: 30 ML/MIN/1.73M2 — LOW
EGFR: 32 ML/MIN/1.73M2 — LOW
EGFR: 37 ML/MIN/1.73M2 — LOW
ESTIMATED AVERAGE GLUCOSE: 131 MG/DL — HIGH (ref 68–114)
GLUCOSE SERPL-MCNC: 160 MG/DL — HIGH (ref 70–99)
GLUCOSE SERPL-MCNC: 191 MG/DL — HIGH (ref 70–99)
GLUCOSE SERPL-MCNC: 193 MG/DL — HIGH (ref 70–99)
HCT VFR BLD CALC: 32.5 % — LOW (ref 39–50)
HGB BLD-MCNC: 10.4 G/DL — LOW (ref 13–17)
MAGNESIUM SERPL-MCNC: 1.9 MG/DL — SIGNIFICANT CHANGE UP (ref 1.6–2.6)
MAGNESIUM SERPL-MCNC: 2.2 MG/DL — SIGNIFICANT CHANGE UP (ref 1.6–2.6)
MAGNESIUM SERPL-MCNC: 2.3 MG/DL — SIGNIFICANT CHANGE UP (ref 1.6–2.6)
MCHC RBC-ENTMCNC: 29.3 PG — SIGNIFICANT CHANGE UP (ref 27–34)
MCHC RBC-ENTMCNC: 32 GM/DL — SIGNIFICANT CHANGE UP (ref 32–36)
MCV RBC AUTO: 91.5 FL — SIGNIFICANT CHANGE UP (ref 80–100)
NRBC # BLD: 0 /100 WBCS — SIGNIFICANT CHANGE UP (ref 0–0)
OSMOLALITY SERPL: 331 MOSMOL/KG — HIGH (ref 280–301)
PHOSPHATE SERPL-MCNC: 1.4 MG/DL — LOW (ref 2.5–4.5)
PHOSPHATE SERPL-MCNC: 4.3 MG/DL — SIGNIFICANT CHANGE UP (ref 2.5–4.5)
PLATELET # BLD AUTO: 167 K/UL — SIGNIFICANT CHANGE UP (ref 150–400)
POTASSIUM SERPL-MCNC: 4 MMOL/L — SIGNIFICANT CHANGE UP (ref 3.5–5.3)
POTASSIUM SERPL-MCNC: 4.1 MMOL/L — SIGNIFICANT CHANGE UP (ref 3.5–5.3)
POTASSIUM SERPL-MCNC: 4.4 MMOL/L — SIGNIFICANT CHANGE UP (ref 3.5–5.3)
POTASSIUM SERPL-SCNC: 4 MMOL/L — SIGNIFICANT CHANGE UP (ref 3.5–5.3)
POTASSIUM SERPL-SCNC: 4.1 MMOL/L — SIGNIFICANT CHANGE UP (ref 3.5–5.3)
POTASSIUM SERPL-SCNC: 4.4 MMOL/L — SIGNIFICANT CHANGE UP (ref 3.5–5.3)
RBC # BLD: 3.55 M/UL — LOW (ref 4.2–5.8)
RBC # FLD: 15.4 % — HIGH (ref 10.3–14.5)
SARS-COV-2 RNA SPEC QL NAA+PROBE: SIGNIFICANT CHANGE UP
SODIUM SERPL-SCNC: 139 MMOL/L — SIGNIFICANT CHANGE UP (ref 135–145)
SODIUM SERPL-SCNC: 147 MMOL/L — HIGH (ref 135–145)
SODIUM SERPL-SCNC: 152 MMOL/L — HIGH (ref 135–145)
T3 SERPL-MCNC: 75 NG/DL — LOW (ref 80–200)
T4 AB SER-ACNC: 7.6 UG/DL — SIGNIFICANT CHANGE UP (ref 4.6–12)
TROPONIN T, HIGH SENSITIVITY RESULT: 480 NG/L — HIGH (ref 0–51)
TROPONIN T, HIGH SENSITIVITY RESULT: 527 NG/L — HIGH (ref 0–51)
TROPONIN T, HIGH SENSITIVITY RESULT: 730 NG/L — HIGH (ref 0–51)
TROPONIN T, HIGH SENSITIVITY RESULT: 763 NG/L — HIGH (ref 0–51)
TSH SERPL-MCNC: 2.34 UIU/ML — SIGNIFICANT CHANGE UP (ref 0.27–4.2)
WBC # BLD: 20.11 K/UL — HIGH (ref 3.8–10.5)
WBC # FLD AUTO: 20.11 K/UL — HIGH (ref 3.8–10.5)

## 2022-11-21 PROCEDURE — 99223 1ST HOSP IP/OBS HIGH 75: CPT

## 2022-11-21 PROCEDURE — 99291 CRITICAL CARE FIRST HOUR: CPT

## 2022-11-21 PROCEDURE — 95720 EEG PHY/QHP EA INCR W/VEEG: CPT

## 2022-11-21 PROCEDURE — 71260 CT THORAX DX C+: CPT | Mod: 26

## 2022-11-21 PROCEDURE — 70496 CT ANGIOGRAPHY HEAD: CPT | Mod: 26

## 2022-11-21 PROCEDURE — 99497 ADVNCD CARE PLAN 30 MIN: CPT | Mod: 25

## 2022-11-21 PROCEDURE — 93970 EXTREMITY STUDY: CPT | Mod: 26

## 2022-11-21 PROCEDURE — 70498 CT ANGIOGRAPHY NECK: CPT | Mod: 26

## 2022-11-21 PROCEDURE — 74177 CT ABD & PELVIS W/CONTRAST: CPT | Mod: 26

## 2022-11-21 RX ORDER — NICARDIPINE HYDROCHLORIDE 30 MG/1
5 CAPSULE, EXTENDED RELEASE ORAL
Qty: 40 | Refills: 0 | Status: DISCONTINUED | OUTPATIENT
Start: 2022-11-21 | End: 2022-11-21

## 2022-11-21 RX ORDER — NOREPINEPHRINE BITARTRATE/D5W 8 MG/250ML
0.05 PLASTIC BAG, INJECTION (ML) INTRAVENOUS
Qty: 8 | Refills: 0 | Status: DISCONTINUED | OUTPATIENT
Start: 2022-11-21 | End: 2022-11-21

## 2022-11-21 RX ORDER — SODIUM CHLORIDE 9 MG/ML
30 INJECTION INTRAMUSCULAR; INTRAVENOUS; SUBCUTANEOUS ONCE
Refills: 0 | Status: COMPLETED | OUTPATIENT
Start: 2022-11-21 | End: 2022-11-21

## 2022-11-21 RX ORDER — LEVOTHYROXINE SODIUM 125 MCG
125 TABLET ORAL DAILY
Refills: 0 | Status: DISCONTINUED | OUTPATIENT
Start: 2022-11-21 | End: 2022-11-22

## 2022-11-21 RX ORDER — NOREPINEPHRINE BITARTRATE/D5W 8 MG/250ML
0.05 PLASTIC BAG, INJECTION (ML) INTRAVENOUS
Qty: 16 | Refills: 0 | Status: DISCONTINUED | OUTPATIENT
Start: 2022-11-21 | End: 2022-11-21

## 2022-11-21 RX ORDER — POLYETHYLENE GLYCOL 3350 17 G/17G
17 POWDER, FOR SOLUTION ORAL EVERY 12 HOURS
Refills: 0 | Status: DISCONTINUED | OUTPATIENT
Start: 2022-11-21 | End: 2022-11-23

## 2022-11-21 RX ORDER — LEVETIRACETAM 250 MG/1
250 TABLET, FILM COATED ORAL
Refills: 0 | Status: DISCONTINUED | OUTPATIENT
Start: 2022-11-21 | End: 2022-11-23

## 2022-11-21 RX ORDER — CHLORHEXIDINE GLUCONATE 213 G/1000ML
1 SOLUTION TOPICAL
Refills: 0 | Status: DISCONTINUED | OUTPATIENT
Start: 2022-11-21 | End: 2022-11-21

## 2022-11-21 RX ORDER — PHENYLEPHRINE HYDROCHLORIDE 10 MG/ML
0.2 INJECTION INTRAVENOUS
Qty: 40 | Refills: 0 | Status: DISCONTINUED | OUTPATIENT
Start: 2022-11-21 | End: 2022-11-21

## 2022-11-21 RX ORDER — SODIUM CHLORIDE 9 MG/ML
10 INJECTION INTRAMUSCULAR; INTRAVENOUS; SUBCUTANEOUS
Refills: 0 | Status: DISCONTINUED | OUTPATIENT
Start: 2022-11-21 | End: 2022-11-21

## 2022-11-21 RX ORDER — NOREPINEPHRINE BITARTRATE/D5W 8 MG/250ML
0.05 PLASTIC BAG, INJECTION (ML) INTRAVENOUS
Qty: 16 | Refills: 0 | Status: DISCONTINUED | OUTPATIENT
Start: 2022-11-21 | End: 2022-11-22

## 2022-11-21 RX ORDER — ATORVASTATIN CALCIUM 80 MG/1
40 TABLET, FILM COATED ORAL AT BEDTIME
Refills: 0 | Status: DISCONTINUED | OUTPATIENT
Start: 2022-11-21 | End: 2022-11-21

## 2022-11-21 RX ORDER — FENTANYL CITRATE 50 UG/ML
50 INJECTION INTRAVENOUS
Refills: 0 | Status: DISCONTINUED | OUTPATIENT
Start: 2022-11-21 | End: 2022-11-22

## 2022-11-21 RX ORDER — SODIUM,POTASSIUM PHOSPHATES 278-250MG
1 POWDER IN PACKET (EA) ORAL ONCE
Refills: 0 | Status: COMPLETED | OUTPATIENT
Start: 2022-11-21 | End: 2022-11-21

## 2022-11-21 RX ORDER — MANNITOL
50 POWDER (GRAM) MISCELLANEOUS ONCE
Refills: 0 | Status: COMPLETED | OUTPATIENT
Start: 2022-11-21 | End: 2022-11-21

## 2022-11-21 RX ORDER — SENNA PLUS 8.6 MG/1
15 TABLET ORAL AT BEDTIME
Refills: 0 | Status: DISCONTINUED | OUTPATIENT
Start: 2022-11-21 | End: 2022-11-23

## 2022-11-21 RX ADMIN — LACOSAMIDE 110 MILLIGRAM(S): 50 TABLET ORAL at 05:27

## 2022-11-21 RX ADMIN — Medication 63.75 MILLIMOLE(S): at 05:27

## 2022-11-21 RX ADMIN — Medication 3.16 MICROGRAM(S)/KG/MIN: at 19:36

## 2022-11-21 RX ADMIN — FENTANYL CITRATE 50 MICROGRAM(S): 50 INJECTION INTRAVENOUS at 18:40

## 2022-11-21 RX ADMIN — PANTOPRAZOLE SODIUM 40 MILLIGRAM(S): 20 TABLET, DELAYED RELEASE ORAL at 12:06

## 2022-11-21 RX ADMIN — CHLORHEXIDINE GLUCONATE 1 APPLICATION(S): 213 SOLUTION TOPICAL at 05:27

## 2022-11-21 RX ADMIN — POLYETHYLENE GLYCOL 3350 17 GRAM(S): 17 POWDER, FOR SOLUTION ORAL at 17:41

## 2022-11-21 RX ADMIN — CHLORHEXIDINE GLUCONATE 1 APPLICATION(S): 213 SOLUTION TOPICAL at 21:39

## 2022-11-21 RX ADMIN — FENTANYL CITRATE 50 MICROGRAM(S): 50 INJECTION INTRAVENOUS at 19:55

## 2022-11-21 RX ADMIN — Medication 3.16 MICROGRAM(S)/KG/MIN: at 07:51

## 2022-11-21 RX ADMIN — SENNA PLUS 15 MILLILITER(S): 8.6 TABLET ORAL at 21:30

## 2022-11-21 RX ADMIN — SODIUM CHLORIDE 30 MILLILITER(S): 9 INJECTION INTRAMUSCULAR; INTRAVENOUS; SUBCUTANEOUS at 00:45

## 2022-11-21 RX ADMIN — SODIUM CHLORIDE 75 MILLILITER(S): 9 INJECTION INTRAMUSCULAR; INTRAVENOUS; SUBCUTANEOUS at 07:51

## 2022-11-21 RX ADMIN — LEVETIRACETAM 250 MILLIGRAM(S): 250 TABLET, FILM COATED ORAL at 17:40

## 2022-11-21 RX ADMIN — Medication 3.16 MICROGRAM(S)/KG/MIN: at 05:26

## 2022-11-21 RX ADMIN — SODIUM CHLORIDE 75 MILLILITER(S): 9 INJECTION INTRAMUSCULAR; INTRAVENOUS; SUBCUTANEOUS at 19:37

## 2022-11-21 RX ADMIN — SODIUM CHLORIDE 75 MILLILITER(S): 9 INJECTION INTRAMUSCULAR; INTRAVENOUS; SUBCUTANEOUS at 03:44

## 2022-11-21 RX ADMIN — Medication 1 APPLICATION(S): at 13:10

## 2022-11-21 RX ADMIN — Medication 1 PACKET(S): at 05:29

## 2022-11-21 RX ADMIN — Medication 1 APPLICATION(S): at 21:30

## 2022-11-21 RX ADMIN — PHENYLEPHRINE HYDROCHLORIDE 5.05 MICROGRAM(S)/KG/MIN: 10 INJECTION INTRAVENOUS at 03:44

## 2022-11-21 RX ADMIN — CHLORHEXIDINE GLUCONATE 15 MILLILITER(S): 213 SOLUTION TOPICAL at 05:26

## 2022-11-21 RX ADMIN — Medication 1500 GRAM(S): at 01:00

## 2022-11-21 RX ADMIN — CHLORHEXIDINE GLUCONATE 15 MILLILITER(S): 213 SOLUTION TOPICAL at 17:40

## 2022-11-21 NOTE — CONSULT NOTE ADULT - PROBLEM SELECTOR RECOMMENDATION 9
No surgical intervention per family request  Catastrophic event with no likelihood for any functional or meaningful recovery  HH4 upon arrival

## 2022-11-21 NOTE — DISCHARGE NOTE NURSING/CASE MANAGEMENT/SOCIAL WORK - NSDCPEFALRISK_GEN_ALL_CORE
For information on Fall & Injury Prevention, visit: [email or web address] OR  [email or web address] OR  https://www.cdc.gov/steadi/patient.html

## 2022-11-21 NOTE — H&P ADULT - NSHPPHYSICALEXAM_GEN_ALL_CORE
Intubated bilateral eye ecchymosis, no eye opening, pupils 3mm non reactive, not following commands, Right side spontaneous and antigravity, Lt side nothing.  1 hour later in CT pt was moving LUE spontaneously

## 2022-11-21 NOTE — ADVANCED PRACTICE NURSE CONSULT - ASSESSMENT
The pt was encountered in the NSCU- Mr Valencia was intubated, unresponsive, on vasopressors for hemodynamic instability. He has a  cervical collar in place and is being turned and positioned with spinal precautions. Staff are off-loading his heels with a william-lock positioner. As he was a/w a pressure injury, a nutrition consult is pending for further evaluation.  Upon assessment, the pt presents with an area of intact skin over the sacrum and b/l buttocks that presents with a change in color tone that is different form the rest of the pts skin. Will classify as a deep tissue injury present on admission. this is the  most likely etiology  given the pts hx of fall and presentation today. Also., a deep tissue injury can take up to 72 hours to manifest. this area measures 8cm x8cm x0cm.  As per discussion with the primary nurse, a Palliative Care consult is pending.

## 2022-11-21 NOTE — CHART NOTE - NSCHARTNOTEFT_GEN_A_CORE
89M PMHx CAD, hypothyroidism s/p fall down 10-12 stairs and found down, intubated in field s/p mannitol at OSH, CTH showing diffuse thick subarachnoid hemorrhage and multiple skull/facial fractures, CTA showing aneurysm.     Neurosurgery resident notified of patient's change in pupil exam from 3mm and fixed to bilateral fixed/dilated pupils consistent with worsening intracranial pressure secondary to sustained intracranial hemorrhagic insults from traumatic and/or aneurysmal hemorrhage. Prior to exam change, daughter Brianda made aware of most likely progression of neurologic pathology and possible paths for management. Risks and benefits of interventions including comfort care, maximum medical management, intracranial pressure monitor, and external ventricular drain, along with likely progression with no intervention, were explained and acknowledged. In accordance with patient's previous living will which described DNR/DNI orders, patient's daughter elected not to proceed with ICP monitor or EVD both before and after exam change.     Medical management was continued with IV mannitol, hypertonic saline.    Further goals of care discussions pending.

## 2022-11-21 NOTE — H&P ADULT - HISTORY OF PRESENT ILLNESS
89 year old male with PMHx of hypothyroidism presented from OSH s/p trauma fall down stairs.  Pt's daughter says that he she fell down 10-12 stairs and was found at the bottom.  Pt was taken to [facility] and CT scan showed extensive SAH. Pt was transferred to NSCU for further management.

## 2022-11-21 NOTE — H&P ADULT - ASSESSMENT
89 year old male with PMHx of hypothyroidism presented from OSH s/p trauma fall down stairs.  Pt's daughter says that he she fell down 10-12 stairs and was found at the bottom.  Pt was taken to [facility] and CT scan showed extensive SAH. Pt was transferred to NSCU for further management.  Also found to have a basilar skull fracture.

## 2022-11-21 NOTE — CHART NOTE - NSCHARTNOTEFT_GEN_A_CORE
EEG preliminary read (not final) on the initial recording hour(s) = x 2    No seizures recorded.  Severe slowing noted, nonspecific.    [facility] EEG Reading Room Ph#: [phone]  Epilepsy Answering Service after 5PM and before 8:30AM: Ph#: [phone]

## 2022-11-21 NOTE — CHART NOTE - NSCHARTNOTEFT_GEN_A_CORE
Patient seen and examined at bedside. No acute surgical intervention indicated. Trauma surg to sign off, please reconsult as needed.

## 2022-11-21 NOTE — CHART NOTE - NSCHARTNOTEFT_GEN_A_CORE
CAPRINI SCORE [CLOT] Score on Admission for     AGE RELATED RISK FACTORS                                                       MOBILITY RELATED FACTORS  [ ] Age 41-60 years                                            (1 Point)                  [ ] Bed rest                                                        (1 Point)  [ ] Age: 61-74 years                                           (2 Points)                 [ ] Plaster cast                                                   (2 Points)  [x ] Age= 75 years                                              (3 Points)                 [ ] Bed bound for more than 72 hours                 (2 Points)    DISEASE RELATED RISK FACTORS                                               GENDER SPECIFIC FACTORS  [ ] Edema in the lower extremities                       (1 Point)                  [ ] Pregnancy                                                     (1 Point)  [ ] Varicose veins                                               (1 Point)                  [ ] Post-partum < 6 weeks                                   (1 Point)             [ ] BMI > 25 Kg/m2                                            (1 Point)                  [ ] Hormonal therapy  or oral contraception          (1 Point)                 [ ] Sepsis (in the previous month)                        (1 Point)                  [ ] History of pregnancy complications                 (1 point)  [ ] Pneumonia or serious lung disease                                               [ ] Unexplained or recurrent                     (1 Point)           (in the previous month)                               (1 Point)  [ ] Abnormal pulmonary function test                     (1 Point)                 SURGERY RELATED RISK FACTORS (include planned surgeries)  [ ] Acute myocardial infarction                              (1 Point)                 [ ]  Section                                             (1 Point)  [ ] Congestive heart failure (in the previous month)  (1 Point)         [ ] Minor surgery                                                  (1 Point)   [ ] Inflammatory bowel disease                             (1 Point)                 [ ] Arthroscopic surgery                                        (2 Points)  [ ] Central venous access                                      (2 Points)                [ ] General surgery lasting more than 45 minutes   (2 Points)       [x ] Stroke (in the previous month)                          (5 Points)               [ ] Elective arthroplasty                                         (5 Points)            [ ] current or past malignancy                              (2 Points)                                                                                                       HEMATOLOGY RELATED FACTORS                                                 TRAUMA RELATED RISK FACTORS  [ ] Prior episodes of VTE                                     (3 Points)                [ ] Fracture of the hip, pelvis, or leg                       (5 Points)  [ ] Positive family history for VTE                         (3 Points)                 [ ] Acute spinal cord injury (in the previous month)  (5 Points)  [ ] Prothrombin 53225 A                                     (3 Points)                 [ ] Paralysis  (less than 1 month)                             (5 Points)  [ ] Factor V Leiden                                             (3 Points)                  [ ] Multiple Trauma within 1 month                        (5 Points)  [ ] Lupus anticoagulants                                     (3 Points)                                                           [ ] Anticardiolipin antibodies                               (3 Points)                                                       [ ] High homocysteine in the blood                      (3 Points)                                             [ ] Other congenital or acquired thrombophilia      (3 Points)                                                [ ] Heparin induced thrombocytopenia                  (3 Points)                                          Total Score [     8     ]    Risk:  Very low 0   Low 1 to 2   Moderate 3 to 4   High =5       VTE Prophylasix Recommednations:  [x ] mechanical pneumatic compression devices                                      [ ] contraindicated: _____________________  [ ] chemo prophylasix                                                                                   [ x] contraindicated _____________________    **** HIGH LIKELIHOOD DVT PRESENT ON ADMISSION  [ x] (please order LE dopplers within 24 hours of admission)

## 2022-11-21 NOTE — CONSULT NOTE ADULT - ASSESSMENT
89 y/o male found at the bottom of staircase at home , transferred to [facility], intubated, [facility] with extensive facial and skull fractures with HH4 MF4 SAH.  Palliative care called for advance care planning and goals of care

## 2022-11-21 NOTE — ADVANCED PRACTICE NURSE CONSULT - REASON FOR CONSULT
Requested by staff to assess skin status of pt a/w a pressure injury. PMH is noted:  89 year old male with PMHx of hypothyroidism presented from OSH s/p trauma fall down stairs.  Pt's daughter says that he she fell down 10-12 stairs and was found at the bottom.  Pt was taken to [facility] and CT scan showed extensive SAH. Pt was transferred to NSCU for further management.

## 2022-11-21 NOTE — DISCHARGE NOTE NURSING/CASE MANAGEMENT/SOCIAL WORK - PATIENT PORTAL LINK FT
2021       Enrique Simms MD  [address]  [place] [address]  [facility] [address]  Via In Basket      Patient: Kaylah Lawrence   YOB: 2002   Date of Visit: 10/5/2021       Dear Dr. Simms:    Thank you for referring kaylah Lawrence to me for evaluation. Below are my notes for this visit with her.    If you have questions, please do not hesitate to call me. I look forward to following your patient along with you.      Sincerely,        Gretchen Ibrahim CNP        CC: No Recipients  Gretchen Ibrahim CNP  10/5/2021  3:10 PM  Signed  OFFICE VISIT      Patient: Kaylah Lawrence Date of Service: 10/5/2021   : 2002 MRN: [id number]     SUBJECTIVE:   HISTORY OF PRESENT ILLNESS:  Kaylah Lawrence is a 18 year old female who presents today for FU abd pain, N/V. Saw in May and July of this year.   Seen in [facility] ED previously for same. CT with IV contrast showed non-specific RLQ mesenteric lymphadenopathy, otherwise normal.   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 UGI xray then without any noted obstruction- xray read as possible \"slight\" extrinsic pressure on right lateral aspect of upper to mid segment of esophagus. Pt denies any dysphagia. She had EGD 2021 with Dr. Fair- normal with gastric BX showing rare h.pylori- treated with amox/clarithro/ppi     FH- No known IBD or CRC  PMH- denies  SH- attending [facility] in , then to [facility]. No smoking. No ETOH, no marijuana. Lives with parents     Today: ABD pain now resolved. States n/v is less frequent and smaller quantity. She finished her amox/clarithromycin/PPI for h.pylori. constipation is well managed- no complaints.       PAST MEDICAL HISTORY:  Past Medical History:   Diagnosis Date   • Abdominal pain    • Concussion    • Emesis        MEDICATIONS:  Current Outpatient Medications   Medication Sig   • omeprazole (PriLOSEC) 40 MG capsule Take 1 capsule by mouth daily. Indications: Infection caused by  Helicobacter Pylori Bacteria   • clarithromycin (BIAXIN) 500 MG tablet Take 1 tablet by mouth 2 times daily. Indications: h pylori infection   • ondansetron (ZOFRAN ODT) 4 MG disintegrating tablet Place 1 tablet onto the tongue every 8 hours as needed for Nausea.     No current facility-administered medications for this visit.       ALLERGIES:  ALLERGIES:   Allergen Reactions   • Acetazolamide DIZZINESS     After using it, had a headache and my stomach was upset       PAST SURGICAL HISTORY:  Past Surgical History:   Procedure Laterality Date   • Egd  08/18/2021    Marv       FAMILY HISTORY:  Family History   Problem Relation Age of Onset   • Diabetes Father    • Diabetes Paternal Grandmother    • Diabetes Paternal Grandfather        SOCIAL HISTORY:  Social History     Tobacco Use   • Smoking status: Never Smoker   • Smokeless tobacco: Never Used   Vaping Use   • Vaping Use: Every day   • Substances: Nicotine   Substance Use Topics   • Alcohol use: No   • Drug use: No       ROS:   Gen: no fatigue, fevers, malaise  HEENT: no dysphagia/odynophagia  CV: no chest pain/sob, no palpitations   Resp: no sob, cough   GI: see HPI  Heme: no bleeding/bruising   : no dysuria or hematuria  MS: no joint pain, swelling   Neuro: no parasthesias, weakness, dizziness or syncope  Derm: no rash, skin changes, jaundice  Psych: no sadness, depression, anxiety, changes in mentation      OBJECTIVE:     Visit Vitals  /85 (BP Location: LUE - Left upper extremity, Patient Position: Sitting, Cuff Size: Regular)   Pulse 80   Temp 98 °F (36.7 °C) (Core)   Ht 5' 6\" (1.676 m)   Wt 87.9 kg (193 lb 12.6 oz)   LMP 08/14/2021   BMI 31.28 kg/m²       PE:   Gen: AOx3, NAD. Well nourished  HEENT: pupils equal with EOM, anicteric, pink conjunctiva, neck is supple. No cervical lymphadenopathy. MM are pink and moist  CV: rrr, no murmur appreciated. No LE edema noted  Resp: cta bilaterally. No resp distress.  Abd: +bs, soft, NT/ND, no HSM, no  masses.   Extr: HUERTA   MS: nl gait and strength  Neuro: no FND   Derm: no rashes or skin lesions  Psych: Mood appropriate. Cooperative.     DIAGNOSTIC STUDIES:   LAB RESULTS    Hospital Outpatient Visit on 08/18/2021   Component Date Value Ref Range Status   • URINE PREGNANCY,QUAL 08/18/2021 Negative  Negative Final   • Internal Procedural Controls Accep* 08/18/2021 Yes   Final   • Case Report 08/18/2021    Final                    Value:Surgical Pathology                                Case: [id number]                                  Authorizing Provider:  Ondina Fair MD   Collected:           08/18/2021 1131              Ordering Location:     [facility]    Received:            08/18/2021 1343                                     GASTROENTEROLOGY                                                             Pathologist:           Alethea Balderas MD                                                        Specimens:   A) - Small Intestine, Duodenum, duodenal biopsies,r/o Celiac                                        B) - Stomach, gastric antrum and body, r/o h pylori                                       • Pathologic Diagnosis 08/18/2021    Final                    Value:This result contains rich text formatting which cannot be displayed here.   • Clinical Information 08/18/2021    Final                    Value:This result contains rich text formatting which cannot be displayed here.   • Gross Description 08/18/2021    Final                    Value:This result contains rich text formatting which cannot be displayed here.   • Disclaimer 08/18/2021    Final                    Value:This result contains rich text formatting which cannot be displayed here.   Lab Services on 08/15/2021   Component Date Value Ref Range Status   • SARS-CoV-2 by PCR 08/15/2021 Not Detected  Not Detected / Detected / Inhibitor Present Final   • Isolation Guidelines 08/15/2021    Final                    Value:This  result contains rich text formatting which cannot be displayed here.   • Procedural Notes 08/15/2021    Final                    Value:This result contains rich text formatting which cannot be displayed here.   Lab Services on 05/25/2021   Component Date Value Ref Range Status   • Fasting Status 05/25/2021 0  Hours Final   • Sodium 05/25/2021 138  135 - 145 mmol/L Final   • Potassium 05/25/2021 3.9  3.4 - 5.1 mmol/L Final   • Chloride 05/25/2021 107  98 - 107 mmol/L Final   • Carbon Dioxide 05/25/2021 25  21 - 32 mmol/L Final   • Anion Gap 05/25/2021 10  10 - 20 mmol/L Final   • Glucose 05/25/2021 96  65 - 99 mg/dL Final   • BUN 05/25/2021 9  6 - 20 mg/dL Final   • Creatinine 05/25/2021 0.69  0.51 - 0.95 mg/dL Final   • Glomerular Filtration Rate 05/25/2021 >90  >90 mL/min/1.73m2 Final    eGFR results = or >90 mL/min/1.73m2 = Normal kidney function.   • BUN/ Creatinine Ratio 05/25/2021 13  7 - 25 Final   • Calcium 05/25/2021 9.4  8.4 - 10.2 mg/dL Final   • Bilirubin, Total 05/25/2021 0.7  0.2 - 1.0 mg/dL Final   • GOT/AST 05/25/2021 11  <=37 Units/L Final   • GPT/ALT 05/25/2021 21  6 - 35 Units/L Final   • Alkaline Phosphatase 05/25/2021 83  42 - 110 Units/L Final   • Albumin 05/25/2021 3.9  3.6 - 5.1 g/dL Final   • Protein, Total 05/25/2021 7.3  6.4 - 8.2 g/dL Final   • Globulin 05/25/2021 3.4  2.0 - 4.0 g/dL Final   • A/G Ratio 05/25/2021 1.1  1.0 - 2.4 Final   • C-Reactive Protein 05/25/2021 0.6  <=1.0 mg/dL Final   • WBC 05/25/2021 8.8  4.2 - 11.0 K/mcL Final   • RBC 05/25/2021 4.92  4.00 - 5.20 mil/mcL Final   • HGB 05/25/2021 13.6  12.0 - 15.5 g/dL Final   • HCT 05/25/2021 42.8  36.0 - 46.5 % Final   • MCV 05/25/2021 87.0  78.0 - 100.0 fl Final   • MCH 05/25/2021 27.6  26.0 - 34.0 pg Final   • MCHC 05/25/2021 31.8* 32.0 - 36.5 g/dL Final   • RDW-CV 05/25/2021 12.4  11.0 - 15.0 % Final   • RDW-SD 05/25/2021 39.2  39.0 - 50.0 fL Final   • PLT 05/25/2021 291  140 - 450 K/mcL Final   • NRBC 05/25/2021 0  <=0 /100  WBC Final   • Neutrophil, Percent 05/25/2021 60  % Final   • Lymphocytes, Percent 05/25/2021 33  % Final   • Mono, Percent 05/25/2021 6  % Final   • Eosinophils, Percent 05/25/2021 1  % Final   • Basophils, Percent 05/25/2021 0  % Final   • Immature Granulocytes 05/25/2021 0  % Final   • Absolute Neutrophils 05/25/2021 5.2  1.8 - 8.0 K/mcL Final   • Absolute Lymphocytes 05/25/2021 2.9  1.2 - 5.2 K/mcL Final   • Absolute Monocytes 05/25/2021 0.6  0.3 - 0.9 K/mcL Final   • Absolute Eosinophils  05/25/2021 0.1  0.0 - 0.5 K/mcL Final   • Absolute Basophils 05/25/2021 0.0  0.0 - 0.3 K/mcL Final   • Absolute Immmature Granulocytes 05/25/2021 0.0  0.0 - 0.2 K/mcL Final   Office Visit on 04/12/2021   Component Date Value Ref Range Status   • COLOR, URINALYSIS 04/12/2021 Yellow   Final   • APPEARANCE, URINALYSIS 04/12/2021 Clear   Final   • GLUCOSE, URINALYSIS 04/12/2021 Negative  Negative mg/dL Final   • BILIRUBIN, URINALYSIS 04/12/2021 Negative  Negative Final   • KETONES, URINALYSIS 04/12/2021 Negative  Negative mg/dL Final   • SPECIFIC GRAVITY, URINALYSIS 04/12/2021 1.029  1.005 - 1.030 Final   • OCCULT BLOOD, URINALYSIS 04/12/2021 Small* Negative Final   • PH, URINALYSIS 04/12/2021 6.0  5.0 - 7.0 Final   • PROTEIN, URINALYSIS 04/12/2021 Negative  Negative mg/dL Final   • UROBILINOGEN, URINALYSIS 04/12/2021 0.2  0.2, 1.0 mg/dL Final   • NITRITE, URINALYSIS 04/12/2021 Negative  Negative Final   • LEUKOCYTE ESTERASE, URINALYSIS 04/12/2021 Negative  Negative Final   • SQUAMOUS EPITHELIAL, URINALYSIS 04/12/2021 1 to 5  None Seen, 1 to 5 /hpf Final   • ERYTHROCYTES, URINALYSIS 04/12/2021 1 to 2  None Seen, 1 to 2 /hpf Final   • LEUKOCYTES, URINALYSIS 04/12/2021 1 to 5  None Seen, 1 to 5 /hpf Final   • BACTERIA, URINALYSIS 04/12/2021 None Seen  None Seen /hpf Final   • HYALINE CASTS, URINALYSIS 04/12/2021 None Seen  None Seen, 1 to 5 /lpf Final   • MUCUS 04/12/2021 Present   Final       TSH (mcUnits/mL)   Date Value    12/22/2020 0.814         Lab Results Reviewed and Diagnostic Data Reviewed    ASSESSMENT AND PLAN:   Kaylah Lawrence is a 18 year old female who presents today for FU abd pain, N/V. Saw in May and July of this year.   Seen in [facility] ED previously for same. CT with IV contrast showed non-specific RLQ mesenteric lymphadenopathy, otherwise normal.   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 UGI xray then without any noted obstruction- xray read as possible \"slight\" extrinsic pressure on right lateral aspect of upper to mid segment of esophagus. Pt denies any dysphagia. She had EGD 8/18/2021 with Dr. Fair- normal with gastric BX showing rare h.pylori- treated with amox/clarithro/ppi    1. H.pylori with normal findings on EGD- discussed with pt  - no noted extrinsic pressure on esophagus as questions on UGI xray  - pt completed course of abx  - check stool sample in 4-5 weeks    2. No further abd pain, but still with some N/V, but this has decreased in frequency and quantity  - instructed pt to f/u with PCP if N/V worsens or persists- should consider neuro workup for her chronic n/v  - understands and no questions    3. F/U prn.      1. H. pylori infection    2. Chronic nausea    3. Non-intractable vomiting with nausea, unspecified vomiting type        Orders Placed This Encounter   • Helicobacter pylori Stool Antigen       The patient indicated understanding of the diagnosis and agreed with the plan of care.                You can access the FollowMyHealth Patient Portal offered by [facility] by registering at the following website: [email or web address]. By joining [facility]’s FollowMyHealth portal, you will also be able to view your health information using other applications (apps) compatible with our system.

## 2022-11-21 NOTE — PROGRESS NOTE ADULT - SUBJECTIVE AND OBJECTIVE BOX
SUMMARY: Reportedly 88yo man found at the bottom of stairs, brought to [facility], intubated, reportedly as SAH on CTH, neurosurgeon and radiologist concerned for aneurysmal SAH pattern, requested transfer. Daughter on her way to [facility] now, [place] [phone]. Reportedly per EMS, in [facility] ED, received 1U pRBC and 60g mannitol.     overnight events: hypotensive on pressors, poor exam    REVIEW OF SYSTEMS: [x] Unable to Assess due to neurologic exam   [ ] All ROS addressed below are non-contributory, except:  Neuro: [ ] Headache [ ] Back pain [ ] Numbness [ ] Weakness [ ] Ataxia [ ] Dizziness [ ] Aphasia [ ] Dysarthria [ ] Visual disturbance  Resp: [ ] Shortness of breath/dyspnea [ ] Orthopnea [ ] Cough  CV: [ ] Chest pain [ ] Palpitation [ ] Lightheadedness [ ] Syncope  Renal: [ ] Thirst [ ] Edema  GI: [ ] Nausea [ ] Emesis [ ] Abdominal pain [ ] Constipation [ ] Diarrhea  Hem: [ ] Hematemesis [ ] bBright red blood per rectum  ID: [ ] Fever [ ] Chills [ ] Dysuria  ENT: [ ] Rhinorrhea    VITALS: [x] Reviewed  ICU Vital Signs Last 24 Hrs:    T(C): 36.1 (21 Nov 2022 04:00), Max: 38 (21 Nov 2022 01:00)  T(F): 97 (21 Nov 2022 04:00), Max: 100.4 (21 Nov 2022 01:00)  HR: 79 (21 Nov 2022 06:30) (58 - 110)  BP: 107/58 (21 Nov 2022 06:15) (84/59 - 155/71)  BP(mean): 73 (21 Nov 2022 06:15) (65 - 97)  ABP: 113/42 (21 Nov 2022 06:30) (87/43 - 182/62)  ABP(mean): 67 (21 Nov 2022 06:30) (58 - 108)  RR: 21 (21 Nov 2022 06:30) (12 - 27)  SpO2: 99% (21 Nov 2022 06:30) (96% - 100%)    I&O's Summary    20 Nov 2022 07:01  -  21 Nov 2022 06:53  --------------------------------------------------------  IN: 1035 mL / OUT: 1440 mL / NET: -405 mL      IMAGING/DATA: [x] Reviewed      IVF FLUIDS/MEDICATIONS: [x] Reviewed    ALLERGIES: Allergies    Intolerances    DEVICES:   [] Restraints [x] PIVs [x] ET tube [] central line [] PICC [x] arterial line [x] nash [x] NGT/OGT [] EVD [] LD [] JAYDON/HMV [] LiCOX [] ICP monitor [] Trach [] PEG [] Chest Tube [] other:    EXAMINATION:  General: No acute distress  HEENT: Anicteric sclerae  Cardiac: [id number]  Lungs: Clear  Abdomen: Soft, non-tender, +BS  Extremities: No c/c/e  Neurologic: b/l periorbital bruising, in C collar, pupils 3mm NR, no EO to stim, RUE spont/AG, LUE no movement, RLE AG to stim, LLE no movement  SUMMARY: Reportedly 89yo man found at the bottom of stairs, brought to [facility], intubated, reportedly as SAH on CTH, neurosurgeon and radiologist concerned for aneurysmal SAH pattern, requested transfer. Daughter on her way to [facility] now, [place] [phone]. Reportedly per EMS, in [facility] ED, received 1U pRBC and 60g mannitol.     overnight events: hypotensive on pressors (levo), poor exam, got mannitol    REVIEW OF SYSTEMS: [x] Unable to Assess due to neurologic exam   [ ] All ROS addressed below are non-contributory, except:  Neuro: [ ] Headache [ ] Back pain [ ] Numbness [ ] Weakness [ ] Ataxia [ ] Dizziness [ ] Aphasia [ ] Dysarthria [ ] Visual disturbance  Resp: [ ] Shortness of breath/dyspnea [ ] Orthopnea [ ] Cough  CV: [ ] Chest pain [ ] Palpitation [ ] Lightheadedness [ ] Syncope  Renal: [ ] Thirst [ ] Edema  GI: [ ] Nausea [ ] Emesis [ ] Abdominal pain [ ] Constipation [ ] Diarrhea  Hem: [ ] Hematemesis [ ] bBright red blood per rectum  ID: [ ] Fever [ ] Chills [ ] Dysuria  ENT: [ ] Rhinorrhea    VITALS: [x] Reviewed  ICU Vital Signs Last 24 Hrs:    T(C): 36.1 (21 Nov 2022 04:00), Max: 38 (21 Nov 2022 01:00)  T(F): 97 (21 Nov 2022 04:00), Max: 100.4 (21 Nov 2022 01:00)  HR: 79 (21 Nov 2022 06:30) (58 - 110)  BP: 107/58 (21 Nov 2022 06:15) (84/59 - 155/71)  BP(mean): 73 (21 Nov 2022 06:15) (65 - 97)  ABP: 113/42 (21 Nov 2022 06:30) (87/43 - 182/62)  ABP(mean): 67 (21 Nov 2022 06:30) (58 - 108)  RR: 21 (21 Nov 2022 06:30) (12 - 27)  SpO2: 99% (21 Nov 2022 06:30) (96% - 100%)    I&O's Summary    20 Nov 2022 07:01  -  21 Nov 2022 06:53  --------------------------------------------------------  IN: 1035 mL / OUT: 1440 mL / NET: -405 mL      IMAGING/DATA: [x] Reviewed      IVF FLUIDS/MEDICATIONS: [x] Reviewed    ALLERGIES: Allergies    Intolerances    DEVICES:   [] Restraints [x] PIVs [x] ET tube [] central line [] PICC [x] arterial line [x] nash [x] NGT/OGT [] EVD [] LD [] JAYDON/HMV [] LiCOX [] ICP monitor [] Trach [] PEG [] Chest Tube [] other:    EXAMINATION:  General: No acute distress  HEENT: Anicteric sclerae  Cardiac: [id number]  Lungs: Clear  Abdomen: Soft, non-tender, +BS  Extremities: No c/c/e  Neurologic: b/l periorbital bruising, in C collar, pupils 4mm NR, no EO to stim, LUE no movement, RUE ext to nox, BLE w spont mov, TF to nox SUMMARY: Reportedly 88yo man found at the bottom of stairs, brought to [facility], intubated, reportedly as SAH on CTH, neurosurgeon and radiologist concerned for aneurysmal SAH pattern, requested transfer. Daughter on her way to [facility] now, [place] [phone]. Reportedly per EMS, in [facility] ED, received 1U pRBC and 60g mannitol.     overnight events: hypotensive on pressors (levo), poor exam, got mannitol    REVIEW OF SYSTEMS: [x] Unable to Assess due to neurologic exam   [ ] All ROS addressed below are non-contributory, except:  Neuro: [ ] Headache [ ] Back pain [ ] Numbness [ ] Weakness [ ] Ataxia [ ] Dizziness [ ] Aphasia [ ] Dysarthria [ ] Visual disturbance  Resp: [ ] Shortness of breath/dyspnea [ ] Orthopnea [ ] Cough  CV: [ ] Chest pain [ ] Palpitation [ ] Lightheadedness [ ] Syncope  Renal: [ ] Thirst [ ] Edema  GI: [ ] Nausea [ ] Emesis [ ] Abdominal pain [ ] Constipation [ ] Diarrhea  Hem: [ ] Hematemesis [ ] bBright red blood per rectum  ID: [ ] Fever [ ] Chills [ ] Dysuria  ENT: [ ] Rhinorrhea    T(C): 35.6 (11-21-22 @ 11:00), Max: 38 (11-21-22 @ 01:00)  HR: 59 (11-21-22 @ 13:00) (43 - 110)  BP: 119/57 (11-21-22 @ 12:00) (84/59 - 155/71)  RR: 18 (11-21-22 @ 13:00) (12 - 27)  SpO2: 100% (11-21-22 @ 13:00) (96% - 100%)  11-20-22 @ 07:01  -  11-21-22 @ 07:00  --------------------------------------------------------  IN: 1040.9 mL / OUT: 1515 mL / NET: -474.1 mL    11-21-22 @ 07:01  -  11-21-22 @ 14:01  --------------------------------------------------------  IN: 624.3 mL / OUT: 690 mL / NET: -65.7 mL    chlorhexidine 0.12% Liquid 15 milliLiter(s) Oral Mucosa every 12 hours  chlorhexidine 4% Liquid 1 Application(s) Topical at bedtime  levETIRAcetam  Solution 250 milliGRAM(s) Oral two times a day  levothyroxine 125 MICROGram(s) Enteral Tube daily  norepinephrine Infusion 0.05 MICROgram(s)/kG/Min IV Continuous <Continuous>  pantoprazole  Injectable 40 milliGRAM(s) IV Push daily  petrolatum Ophthalmic Ointment 1 Application(s) Both EYES every 8 hours  polyethylene glycol 3350 17 Gram(s) Oral every 12 hours  senna Syrup 15 milliLiter(s) Oral at bedtime  sodium chloride 0.9%. 1000 milliLiter(s) IV Continuous <Continuous>  Mode: AC/ CMV (Assist Control/ Continuous Mandatory Ventilation), RR (machine): 18, TV (machine): 450, FiO2: 40, PEEP: 5, ITime: 1, MAP: 8, PIP: 14  IMAGING/DATA: [x] Reviewed      IVF FLUIDS/MEDICATIONS: [x] Reviewed    ALLERGIES: Allergies    Intolerances    DEVICES:   [] Restraints [x] PIVs [x] ET tube [] central line [] PICC [x] arterial line [x] nash [x] NGT/OGT [] EVD [] LD [] JAYDON/HMV [] LiCOX [] ICP monitor [] Trach [] PEG [] Chest Tube [] other:    EXAMINATION:  General: No acute distress  HEENT: Anicteric sclerae  Cardiac: [id number]  Lungs: Clear  Abdomen: Soft, non-tender, +BS  Extremities: No c/c/e  Neurologic: b/l periorbital bruising, in C collar, pupils 4mm NR, no EO to stim, LUE no movement, RUE ext to nox, BLE w spont mov, TF to nox

## 2022-11-21 NOTE — CONSULT NOTE ADULT - SUBJECTIVE AND OBJECTIVE BOX
HPI:  89 year old male with PMHx of hypothyroidism presented from OSH s/p trauma fall down stairs.  Pt's daughter says that he she fell down 10-12 stairs and was found at the bottom.  Pt was taken to [facility] and CT scan showed extensive SAH. Pt was transferred to [facility] for further management.   (21 Nov 2022 02:16)    PERTINENT PM/SXH:   Hypothyroidism        FAMILY HISTORY:    Family Hx substance abuse [ ]yes [ ]no  ITEMS NOT CHECKED ARE NOT PRESENT    SOCIAL HISTORY:   Significant other/partner[ ]  Children[X ]  Gnosticism/Spirituality:  Substance hx:  [ ]   Tobacco hx:  [ ]   Alcohol hx: [ ]   Home Opioid hx:  [ ] I-Stop Reference No:  Living Situation: [ X]Home  [ ]Long term care  [ ]Rehab [ ]Other    ADVANCE DIRECTIVES:    DNR/MOLST  [X ]  Living Will  [ ]   DECISION MAKER(s):  [ ] Health Care Proxy(s)  [ X] Surrogate(s)  [ ] Guardian           Name(s): Phone Number(s):  DR Merrick HURT AND GABRIELA CASTANO :  DAUGHTERS   BASELINE (I)ADL(s) (prior to admission):  [place]: [X ]Total  [ ] Moderate [ ]Dependent    Allergies    No Known Allergies    Intolerances    MEDICATIONS  (STANDING):  chlorhexidine 0.12% Liquid 15 milliLiter(s) Oral Mucosa every 12 hours  chlorhexidine 4% Liquid 1 Application(s) Topical at bedtime  levETIRAcetam  Solution 250 milliGRAM(s) Oral two times a day  levothyroxine 125 MICROGram(s) Enteral Tube daily  norepinephrine Infusion 0.05 MICROgram(s)/kG/Min (3.16 mL/Hr) IV Continuous <Continuous>  pantoprazole  Injectable 40 milliGRAM(s) IV Push daily  petrolatum Ophthalmic Ointment 1 Application(s) Both EYES every 8 hours  polyethylene glycol 3350 17 Gram(s) Oral every 12 hours  senna Syrup 15 milliLiter(s) Oral at bedtime  sodium chloride 0.9%. 1000 milliLiter(s) (75 mL/Hr) IV Continuous <Continuous>    MEDICATIONS  (PRN):    PRESENT SYMPTOMS: [ X]Unable to self-report  [ ] CPOT [ ] PAINADs [ ] RDOS  Source if other than patient:  [ ]Family   [ ]Team     Pain: [ ]yes [ ]no  QOL impact -   Location -                    Aggravating factors -  Quality -  Radiation -  Timing-  Severity (0-10 scale):  Minimal acceptable level (0-10 scale):     CPOT:  4  https://[email or web address]/getattachment/[email or web address]/Critical-Care-Pain-Observation-Tool-(CPOT)    PAIN AD Score:   http://geriatrictoolkit.[email or web address]/painad.pdf (press ctrl +  left click to view)    Dyspnea:                           [ ]Mild [ ]Moderate [ ]Severe      RDOS:  vented xx  0 to 2  minimal or no respiratory distress   3  mild distress  4 to 6 moderate distress  >7 severe distress  https://homecareinformation.net/handouts/hen/Respiratory_Distress_Observation_Scale.pdf (Ctrl +  left click to view)     Anxiety:                             [ ]Mild [x ]Moderate [ ]Severe  Fatigue:                             [ ]Mild [ ]Moderate [x ]Severe  Nausea:                             [ ]Mild [ ]Moderate [ ]Severe  Loss of appetite:              [ ]Mild [ ]Moderate x[ ]Severe  Constipation:                    [ ]Mild [ ]Moderate [ ]Severe    PCSSQ [Palliative Care Spiritual Screening Question]   Severity (0-10):0  Score of 4 or > indicate consideration of Chaplaincy referral.  Chaplaincy Referral: [ ] yes [ ] refused [ ] following    Caregiver [place]? : [ ] yes [ x] no  Social work referral [ ] Patient & Family Centered Care Referral [ ]     Anticipatory Grief Present?: [ ] yes [x ] no  Social work referral [ ]  Patient & Family Centered Care Referral [ ]       Other Symptoms:  [x ]All other review of systems negative     Palliative Performance Status Version 2:  10       %    http://npcrc.org/files/news/palliative_performance_scale_ppsv2.pdf  PHYSICAL EXAM:  Vital Signs Last 24 Hrs  T(C): 35.6 (21 Nov 2022 11:00), Max: 38 (21 Nov 2022 01:00)  T(F): 96.1 (21 Nov 2022 11:00), Max: 100.4 (21 Nov 2022 01:00)  HR: 55 (21 Nov 2022 14:00) (43 - 110)  BP: 119/57 (21 Nov 2022 12:00) (84/59 - 155/71)  BP(mean): 75 (21 Nov 2022 12:00) (65 - 97)  RR: 16 (21 Nov 2022 13:45) (12 - 27)  SpO2: 100% (21 Nov 2022 14:00) (96% - 100%)    Parameters below as of 20 Nov 2022 20:53  Patient On (Oxygen Delivery Method): ventilator    O2 Concentration (%): 80 I&O's Summary    20 Nov 2022 07:01  -  21 Nov 2022 07:00  --------------------------------------------------------  IN: 1040.9 mL / OUT: 1515 mL / NET: -474.1 mL    21 Nov 2022 07:01  -  21 Nov 2022 14:40  --------------------------------------------------------  IN: 704.5 mL / OUT: 690 mL / NET: 14.5 mL      GENERAL: [ ]Cachexia    [ ]Alert  [ ]Oriented x   [ ]Lethargic  [ x]Unarousable  [ ]Verbal  [ x]Non-Verbal  Behavioral:   [ ] Anxiety  [ ] Delirium [ ] Agitation [ ] Other  HEENT:  [ ]Normal   [ ]Dry mouth   [ x]ET Tube/Trach  [ ]Oral lesions  PULMONARY:   [ ]Clear [ ]Tachypnea  [x ]Audible excessive secretions   [ ]Rhonchi        [ ]Right [ ]Left [ ]Bilateral  [ ]Crackles        [ ]Right [ ]Left [ ]Bilateral  [ ]Wheezing     [ ]Right [ ]Left [ ]Bilateral  [ ]Diminished breath sounds [ ]right [ ]left [ ]bilateral  CARDIOVASCULAR:    [ ]Regular [ x]Irregular [ ]Tachy  [ ]Reynaldo [ ]Murmur [ ]Other  GASTROINTESTINAL:  [x ]Soft  [ ]Distended   [ x]+BS  [ ]Non tender [ ]Tender  [ ]Other [ ]PEG [x ]OGT/ NGT  Last BM:  GENITOURINARY:  [ ]Normal [ x] Incontinent   [ ]Oliguria/Anuria   [ ]Barnes  MUSCULOSKELETAL:   [ ]Normal   [ ]Weakness  [x ]Bed/Wheelchair bound [ ]Edema  NEUROLOGIC:   [ ]No focal deficits  [x ]Cognitive impairment  [ ]Dysphagia [ ]Dysarthria [ ]Paresis [ ]Other   SKIN:   [ ]Normal  [ ]Rash  [x ]Other  [ ]Pressure ulcer(s)       Present on admission [ ]y [ ]n    CRITICAL CARE:  [ ] Shock Present  [ ]Septic [ ]Cardiogenic [ ]Neurologic [ ]Hypovolemic  [ ]  Vasopressors [ ]  Inotropes   [ x]Respiratory failure present [x ]Mechanical ventilation [ ]Non-invasive ventilatory support [ ]High flow  Mode: AC/ CMV (Assist Control/ Continuous Mandatory Ventilation), RR (machine): 18, TV (machine): 450, FiO2: 40, PEEP: 5, ITime: 1, MAP: 8, PIP: 14  [x ]Acute  [ ]Chronic [ ]Hypoxic  [ ]Hypercarbic [ ]Other  [x ]Other organ failure     LABS:                        12.0   17.73 )-----------( 191      ( 20 Nov 2022 21:43 )             37.0   11-21    147<H>  |  116<H>  |  35<H>  ----------------------------<  193<H>  4.4   |  21<L>  |  1.99<H>    Ca    8.4      21 Nov 2022 12:45  Phos  1.4     11-21  Mg     2.2     11-21    TPro  6.0  /  Alb  3.4  /  TBili  0.6  /  DBili  x   /  AST  91<H>  /  ALT  72<H>  /  AlkPhos  97  11-20  PT/INR - ( 20 Nov 2022 21:43 )   PT: 12.7 sec;   INR: 1.10 ratio         PTT - ( 20 Nov 2022 21:43 )  PTT:26.4 sec      RADIOLOGY & ADDITIONAL STUDIES:    < from: CT Angio Head w/ IV Cont (11.21.22 @ 00:35) >  3.  CT Angiography of the intracranial circulation with IV contrast (and   without, if performed)    CLINICAL INFORMATION: Trauma intracranial hemorrhage    TECHNIQUE:    1. CT head: Initial noncontrast axial CT was obtained to the head with 3   mm sections. This data was reconstructed in the sagittal and coronal   planes  2. and 3.   CT angiography of the carotid and intracranial circulation   images were acquired at 1 mm thickness from the skull base to the skull   vertex as a single helical acquisition.   Images were acquired during   rapid bolus intravenous administration of  nonionic contrast   volume 90   cc administered  (or default volume 100 mL).  This data set was   reconstructed sagittal and coronal images.  3D MIP reconstruction images   were obtained.    Post processing angiographic reconstruction of images   was performed. This data set was  reconstructed  and reviewed in multiple   projections to evaluate carotid morphology and intracranial vessels.     The magnitude of stenosis was evaluated based on the diameter of an   intact distal of the internal carotid artery using NASCET criteria.    DOSE INFORMATION:   This scan was performed using automatic exposure   control (radiation dose reduction software) to obtain a diagnostic image   quality scan with patient dose as low as reasonably achievable.   Total   DLP for this examination is estimated at 1958 mGy-cm.    COMPARISON:    CT head and cervical spine outside site earlier same date   5:42 PM  available for review.    FINDINGS:    1.  BRAIN:    BRAIN and CSF SPACES:  The again demonstrates generalized subarachnoid   space hemorrhage. This includes opacification of the superior sagittal   sinus, sylvian fissures and sulci of the frontal horns. Subdural space   hemorrhage is evident over the right convexity, measuring up to 0.8 cm   thickness and variable intensity.  Additional subdural space fluid is   found along the falx to the right of midline. Hemorrhagic brain   contusions are noted in the right temporal tip, the right genu of the   corpus callosum and likely within the left frontal lobe parasagittal   aspect. There is dependent layering of intraventricular hemorrhage. The   basal cisterns are also opacified by hemorrhage. The ventricles are not   dilated.    HEAD AND NECK STRUCTURES:  The facial structures are again noted for   numerous facial fractures. This includes on the right zygomaticofacial   complex fracture with buckling of the posterior zygomatic arch and   fractures of the maxillary sinus anterior and posterior walls, the latter   extending into the lateral orbital wall. There is also fracture of the   squamous portion of the calvarium with inward buckling and rotation of   the pterion. There is strictly underlying intracranial emphysema.   Calvarial fractures also involve right frontal bone near the midline   communicating to the frontal sinus and at the superior lateral orbital   roof. Right lamina papyracea fracture is also evident. On the left there   is fractures of the left maxillary sinus anterior and posterior walls.   These fractures extend to the orbital floor. Bilateral nasal fractures   are present. The pterygoid plates appear to remain intact. Hemorrhagic   fluid collections are present in the maxillary sinuses. There is soft   tissue emphysema and anterior cranial fossa intracranial emphysema   associated with these fractures.  The nasopharynx is lobulated and   swollen greatest to the right of midline.  The central skull base is   intact.  The temporal bones demonstrate patent petrous air cells. Soft   tissue swelling extends from the face to the right forehead.    2.  CAROTID CIRCULATION:    The thoracic aortic arch demonstrates calcified and non-calcified   atherosclerotic plaque.  The great vessel origins demonstrate luminal   irregularity the innominate artery suggesting extensive plaque and   possibly short segmental dissection.    The right carotid circulation demonstrates tortuous and calcified   atherosclerotic plaque within the common carotid artery. Luminal   irregularity but no critical stenosis results. The carotid bulb images   mild calcified atherosclerotic plaque without hemodynamically significant   stenosis.  The internal carotid is patent to the skull base.    The left carotid circulation demonstrates an intact common carotid   artery.  The carotid bulb demonstrates predominantly calcified   atherosclerotic plaque without hemodynamically significant stenosis.  The   internal carotid is patent to the skull base.    The vertebral arteries are patent.  The degree of vertebral asymmetry is   within physiologic limits. Each vertebral artery ascends in the neck with   near constant caliber.  The left vertebral artery demonstrates tortuosity   and a vascular loop in its ascending cervical segment.    3.  INTRACRANIAL CIRCULATION:    The ANTERIOR circulation demonstrates intact inflow from the ascending   cervical segment to the petrous segment of each internal carotid artery.   The cavernous and clinoid segments demonstrate mild calcified   atherosclerotic plaque. No significant stenosisresults.   The ophthalmic   arteries are demonstrated as patent vessels on each side.    The anterior   cerebral arteries are patent and symmetric.  An anterior communicating   artery is present. The right internal carotid artery demonstrates   saccular aneurysm just proximal to the posterior communicating arterial   origin along its superior surface. This aneurysm measures approximately   1.7 x 1.4 cm in maximum dimension. This includes larger anterior and   smaller posterior loops. It appears to have broad neck with the internal   carotid The right middle cerebral artery demonstrates an intact initial   M1 segment and patent peripheral anterior and posterior division sylvian   branches.  The left middle cerebral artery demonstrates an intact initial   M1 segment and patent peripheral anterior and posterior division sylvian   branches.    The POSTERIOR circulation demonstrates intact inflow from each vertebral   artery.   The left vertebral artery is dominant caliber. The smaller   caliber right vertebral artery is attenuated distal to its plane, without   focal stenosis PICA arteries are patent on each side.  The basilar artery   appears intact.  Superior cerebellar arteries are demonstrated.    Posterior communicating arteries is demonstrated on the right, not   clearly seen on the left.   Each posterior cerebral artery is patent to   peripheral branching.    The principal dural venous sinuses are patent.  This includes the   superior sagittal sinus anterior and posterior limbs.  Each transverse   sinus is patent to sigmoid sinuses and patent jugular veins.    No abnormal vessel termination, intracranial aneurysm or vascular   malformation is identified.    ADDITIONAL FINDINGS:  Patchy pulmonary ground glass densities present in   the lung apices. An endotracheal tube extends to the janneth eccentric to   the right. A nasogastric catheters extends to below the anatomic coverage   of the study. Cervical spine is significant for fracture at C1 and   anterior subluxation with respect to the foramen magnum.      IMPRESSION:    1.  Brain:  Extensive posttraumatic injury includes multiple facial   fractures and calvarial fractures. Extensive intracranial hemorrhage   includes throughout much of the anterior subarachnoid space extending to   the ventricles and basal cisterns, right-sided subdural space and   hemorrhagic brain contusions. Posttraumatic intracranial emphysema.    2.  Right carotid system:    Luminal irregularity of the innominate   artery reflects atherosclerotic plaque and possible superimposed short   segment arterial dissection. Atherosclerotic plaque in the common carotid   artery and carotid bulb without hemodynamically significant stenosis    3   Left carotid system:     Atherosclerotic plaque without    hemodynamically significant stenosis.    4.   Vertebral circulation:    Patent.    5.  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    6.  Posterior intracranial circulation:    Unremarkable.    7. C1 fracture. Anterior subluxation of the cervical spine with respect   to the foramen magnum. Cervical degenerative disc disease.    8. Endotracheal tube terminates at the janneth directed to the right.   Proximal repositioning 1 to 2 cm recommended.    --- End of Report ---              < end of copied text >      PROTEIN CALORIE MALNUTRITION PRESENT: [ ]mild [x ]moderate [ ]severe [ ]underweight [ ]morbid obesity  https://www.andeal.org/vault/2440/web/files/ONC/Table_Clinical%20Characteristics%20to%20Document%20Malnutrition-White%20JV%20et%20al%202012.pdf      Weight (kg): 67.3 (11-20-22 @ 20:53)    [ ]PPSV2 < or = to 30% [ ]significant weight loss  [ x]poor nutritional intake  [ ]anasarca[ ]Artificial Nutrition      Other REFERRALS:  [ ]Hospice  [ ]Child Life  [xSocial Work  [ ]Case management [ ]Holistic Therapy

## 2022-11-21 NOTE — ADVANCED PRACTICE NURSE CONSULT - RECOMMEDATIONS
Will recommend the followin. sacrum, b/l buttocks: continue to monitor- routine pericare with brooke  2. Continue T&P, heel off-loading  3. Nutrition support as pt condition allows  Tx plan discussed with RN

## 2022-11-21 NOTE — PROGRESS NOTE ADULT - CRITICAL CARE ATTENDING COMMENT
GOC discussion family would like to proceed with comfort measures , DNR/DNI, live on informed, will transfer to PCU

## 2022-11-21 NOTE — CONSULT NOTE ADULT - CONVERSATION DETAILS
Met with patients daughters / surrogates:  Dr Brianda Valencia and daughter Daysi eDwey , face to face for greater than 45 minutes.  In attendance also was [facility] fellow, Dr James Arciniega  .   After appropriate introductions and evaluation of family understanding of patients overall medical issues , Dr. Arciniega demonstrated CT scans of patients brain ,explaining the patients  devastating bleed and damage to his brain that will result in patient requiring persistent ventilatory support , if the patient survives the hospitalization.   Daughters all in agreement that patient would never want to live a life of dependency and all wish to pursue a symptom mediated approach with compassionate extubation once friends and family have had a chance to visit.  Daughters agree to a transfer to PCU when bed available.    Julio maldonado, [facility] with [facility]omaira  [facility]

## 2022-11-21 NOTE — PROGRESS NOTE ADULT - ASSESSMENT
ASSESSMENT/PLAN: presumed trauma, found at the bottom of stairs    NEURO:  needs repeat CTH/CTA now once renal function back  vimpat 50mg bid for seizure ppx  CT Cspine  C collar at all times for now  will need vEEG once scans done   Activity: [] mobilize as tolerated [x] Bedrest [] PT [] OT [] PMNR    PULM:  intubated, CXR, ABG  18/450/5/40%    CV:  SBP goal 100-140 for now, need f/u scans to assess for traumatic SAH vs. aneurysmal   EKG LBBB  TTE pending  check trops     RENAL:  Fluids: IVF     GI:  Diet: NPO   GI prophylaxis [] not indicated [x] PPI [] other:  Bowel regimen [] colace [x] senna [x] other: miraalx     ENDO:   Goal euglycemia (-180)  TFT  reportedly hx hypothyroid, will need to resume synthroid     HEME/ONC:  VTE prophylaxis: [x] SCDs [] chemoprophylaxis [x] hold chemoprophylaxis due to:  SAH [] high risk of DVT/PE on admission due to:    ID: monitor for fevers     MISC: trauma consult, CT C/A/P for eval ?reported fall down the stairs    SOCIAL/FAMILY:  [x] awaiting [] updated at bedside [] family meeting    CODE STATUS:  [x] Full Code [] DNR [] DNI [] Palliative/Comfort Care    DISPOSITION:  [x] ICU [] Stroke Unit [] Floor [] EMU [] RCU [] PCU    [x] Patient is at high risk of neurologic deterioration/death due to: respiratory failure requiring intubation, SAH     Time spent: 50 critical care minutes    Contact: [phone] 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, extensive skull and face fractures    NEURO:  nchecksq4 coma  poor prognosis will continue GOC  notify live on   CTH/CTA findings reviewed, C1 fracture  vimpat 50mg bid for seizure ppx but has BBB, switch to keppra renally dosed   CT C spine  C collar at all times for now  poor prognosis will discuss w family, consult palliative  family refused nsgy intervention  Normonatremia  bmp q8h  Activity: [] mobilize as tolerated [x] Bedrest [] PT [] OT [] PMNR    PULM:  intubated, CXR, ABG  18/450/5/40%  pull back et 2 cm     CV: troponemia, likely demand but w LBBB unsure if old  SBP goal 100-140 for now, need f/u scans to assess for traumatic SAH vs. aneurysmal   EKG LBBB ( old vs new?)  TTE pending  trend trops   [facility] OSH  resume statin  R fem a line from OSH  levo    RENAL:  Fluids: IVF ns 75cc  Barnes exchanged here  keep net even    GI:  Diet: NPO, OGT  GI prophylaxis [] not indicated [x] PPI [] other:  Bowel regimen [] colace [x] senna [x] other: miraalx     ENDO:   Goal euglycemia (-180)  TFT wnl  reportedly hx hypothyroid,  resume synthroid po 125    HEME/ONC:  VTE prophylaxis: [x] SCDs [] chemoprophylaxis [x] hold chemoprophylaxis due to:  SAH [] high risk of DVT/PE on admission due to:    ID: monitor for fevers     MISC: trauma consulted, to follow,reported fall down the stairs    SOCIAL/FAMILY:  [x] awaiting [] updated at bedside [] family meeting    CODE STATUS:  [x] Full Code [] DNR [] DNI [] Palliative/Comfort Care    DISPOSITION:  [x] ICU [] Stroke Unit [] Floor [] EMU [] RCU [] PCU    [x] Patient is at high risk of neurologic deterioration/death due to: respiratory failure requiring intubation, SAH       Contact: [phone] 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, extensive skull and face fractures    NEURO:  nchecksq4 coma  poor prognosis will continue GOC  notify live on   CTH/CTA findings reviewed, C1 fracture  vimpat 50mg bid for seizure ppx but has BBB, switch to keppra renally dosed   holding nimodipine given on pressors  CT C spine  C collar at all times for now  poor prognosis will discuss w family, consult palliative  family refused nsgy intervention  Normonatremia  bmp q8h  Activity: [] mobilize as tolerated [x] Bedrest [] PT [] OT [] PMNR    PULM:  intubated, CXR, ABG  18/450/5/40%  pull back et 2 cm     CV: troponemia, likely demand but w LBBB unsure if old  SBP goal 100-140 for now, need f/u scans to assess for traumatic SAH vs. aneurysmal   EKG LBBB ( old vs new?)  TTE pending  trend trops   [facility] OSH  resume statin  R fem a line from OSH  levo    RENAL:  Fluids: IVF ns 75cc  Barnes exchanged here  keep net even    GI:  Diet: NPO, OGT  GI prophylaxis [] not indicated [x] PPI [] other:  Bowel regimen [] colace [x] senna [x] other: miraalx     ENDO:   Goal euglycemia (-180)  TFT wnl  reportedly hx hypothyroid,  resume synthroid po 125    HEME/ONC:  VTE prophylaxis: [x] SCDs [] chemoprophylaxis [x] hold chemoprophylaxis due to:  SAH [] high risk of DVT/PE on admission due to:    ID: monitor for fevers     MISC: trauma consulted, to follow,reported fall down the stairs    SOCIAL/FAMILY:  [x] awaiting [] updated at bedside [] family meeting    CODE STATUS:  [x] Full Code [] DNR [] DNI [] Palliative/Comfort Care    DISPOSITION:  [x] ICU [] Stroke Unit [] Floor [] EMU [] RCU [] PCU    [x] Patient is at high risk of neurologic deterioration/death due to: respiratory failure requiring intubation, SAH       Contact: [phone] 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, extensive skull and face fractures    NEURO:  nchecks q4 coma  poor prognosis will continue GOC  notify live on   CTH/CTA findings reviewed, C1 fracture  vimpat 50mg bid for seizure ppx but has BBB, switch to keppra renally dosed   holding nimodipine given on pressors  CT C spine  C collar at all times for now  poor prognosis will discuss w family, consult palliative  family refused nsgy intervention  Normonatremia  bmp q8h  Activity: [] mobilize as tolerated [x] Bedrest [] PT [] OT [] PMNR    PULM:  acute respiratory failure   intubated,   18/450/5/40%  pull back et 2 cm as it is in the right bronchus     CV: elevated troponin , likely demand ischemia   SBP goal 100-180 for now, traumatic   EKG LBBB ( old vs new?)  TTE pending  R fem a line from OSH  hypotension on levo     RENAL:  Fluids: IVF ns 75cc  Barnes exchanged here  keep net even    GI:  Diet: NPO, OGT  GI prophylaxis [] not indicated [x] PPI [] other:  Bowel regimen [] colace [x] senna [x] other: miraalx     ENDO:   Goal euglycemia (-180)  TFT wnl  reportedly hx hypothyroid,  resume synthroid po 125    HEME/ONC:  VTE prophylaxis: [x] SCDs [] chemoprophylaxis [x] hold chemoprophylaxis due to:  SAH [] high risk of DVT/PE on admission due to:    ID: monitor for fevers     MISC: trauma consulted, to follow,reported fall down the stairs    SOCIAL/FAMILY:  [x] awaiting [] updated at bedside [] family meeting    CODE STATUS:  [x] Full Code [] DNR [] DNI [] Palliative/Comfort Care    DISPOSITION:  [x] ICU [] Stroke Unit [] Floor [] EMU [] RCU [] PCU    [x] Patient is at high risk of neurologic deterioration/death due to: respiratory failure requiring intubation, SAH       Contact: [phone]

## 2022-11-21 NOTE — H&P ADULT - PROBLEM SELECTOR PLAN 1
ICU to ICU transfer from [facility]   CT repeat along with CTA head and neck   CT C/A/P for trauma work up   Trauma called to consult s/p fall   Neuro checks Q1 hour   Vimpat for seizure ppx   EEG for exam   Given 50G at OSH ED   Keep SBP  (augmenting with Mian as needed)   F/u SAH admission labs   D/w daughter about prognosis, pt has living will which she will work on bringing in.

## 2022-11-22 LAB
ALBUMIN SERPL ELPH-MCNC: 2.7 G/DL — LOW (ref 3.3–5)
ALBUMIN SERPL ELPH-MCNC: 2.8 G/DL — LOW (ref 3.3–5)
ALBUMIN SERPL ELPH-MCNC: 3 G/DL — LOW (ref 3.3–5)
ALP SERPL-CCNC: 69 U/L — SIGNIFICANT CHANGE UP (ref 40–120)
ALP SERPL-CCNC: 77 U/L — SIGNIFICANT CHANGE UP (ref 40–120)
ALP SERPL-CCNC: 83 U/L — SIGNIFICANT CHANGE UP (ref 40–120)
ALT FLD-CCNC: 41 U/L — SIGNIFICANT CHANGE UP (ref 10–45)
ALT FLD-CCNC: 41 U/L — SIGNIFICANT CHANGE UP (ref 10–45)
ALT FLD-CCNC: 43 U/L — SIGNIFICANT CHANGE UP (ref 10–45)
ANION GAP SERPL CALC-SCNC: 10 MMOL/L — SIGNIFICANT CHANGE UP (ref 5–17)
ANION GAP SERPL CALC-SCNC: 11 MMOL/L — SIGNIFICANT CHANGE UP (ref 5–17)
ANION GAP SERPL CALC-SCNC: 11 MMOL/L — SIGNIFICANT CHANGE UP (ref 5–17)
APPEARANCE UR: ABNORMAL
APTT BLD: 26.8 SEC — LOW (ref 27.5–35.5)
APTT BLD: 27.6 SEC — SIGNIFICANT CHANGE UP (ref 27.5–35.5)
AST SERPL-CCNC: 45 U/L — HIGH (ref 10–40)
AST SERPL-CCNC: 49 U/L — HIGH (ref 10–40)
AST SERPL-CCNC: 55 U/L — HIGH (ref 10–40)
BACTERIA # UR AUTO: NEGATIVE — SIGNIFICANT CHANGE UP
BASE EXCESS BLDA CALC-SCNC: -3.7 MMOL/L — LOW (ref -2–3)
BASE EXCESS BLDA CALC-SCNC: -5 MMOL/L — LOW (ref -2–3)
BILIRUB DIRECT SERPL-MCNC: 0.2 MG/DL — SIGNIFICANT CHANGE UP (ref 0–0.3)
BILIRUB DIRECT SERPL-MCNC: 0.2 MG/DL — SIGNIFICANT CHANGE UP (ref 0–0.3)
BILIRUB SERPL-MCNC: 0.3 MG/DL — SIGNIFICANT CHANGE UP (ref 0.2–1.2)
BILIRUB SERPL-MCNC: 0.5 MG/DL — SIGNIFICANT CHANGE UP (ref 0.2–1.2)
BILIRUB SERPL-MCNC: 0.6 MG/DL — SIGNIFICANT CHANGE UP (ref 0.2–1.2)
BILIRUB UR-MCNC: NEGATIVE — SIGNIFICANT CHANGE UP
BUN SERPL-MCNC: 35 MG/DL — HIGH (ref 7–23)
BUN SERPL-MCNC: 38 MG/DL — HIGH (ref 7–23)
BUN SERPL-MCNC: 38 MG/DL — HIGH (ref 7–23)
CALCIUM SERPL-MCNC: 7.7 MG/DL — LOW (ref 8.4–10.5)
CALCIUM SERPL-MCNC: 8.2 MG/DL — LOW (ref 8.4–10.5)
CALCIUM SERPL-MCNC: 8.3 MG/DL — LOW (ref 8.4–10.5)
CHLORIDE SERPL-SCNC: 126 MMOL/L — HIGH (ref 96–108)
CHLORIDE SERPL-SCNC: 127 MMOL/L — HIGH (ref 96–108)
CHLORIDE SERPL-SCNC: 129 MMOL/L — HIGH (ref 96–108)
CO2 BLDA-SCNC: 23 MMOL/L — SIGNIFICANT CHANGE UP (ref 19–24)
CO2 BLDA-SCNC: 23 MMOL/L — SIGNIFICANT CHANGE UP (ref 19–24)
CO2 SERPL-SCNC: 20 MMOL/L — LOW (ref 22–31)
CO2 SERPL-SCNC: 20 MMOL/L — LOW (ref 22–31)
CO2 SERPL-SCNC: 22 MMOL/L — SIGNIFICANT CHANGE UP (ref 22–31)
COLOR SPEC: ABNORMAL
COMMENT - URINE: SIGNIFICANT CHANGE UP
CREAT SERPL-MCNC: 2.64 MG/DL — HIGH (ref 0.5–1.3)
CREAT SERPL-MCNC: 2.83 MG/DL — HIGH (ref 0.5–1.3)
CREAT SERPL-MCNC: 2.87 MG/DL — HIGH (ref 0.5–1.3)
DIFF PNL FLD: ABNORMAL
EGFR: 20 ML/MIN/1.73M2 — LOW
EGFR: 21 ML/MIN/1.73M2 — LOW
EGFR: 22 ML/MIN/1.73M2 — LOW
EPI CELLS # UR: 6 — SIGNIFICANT CHANGE UP
FIBRINOGEN PPP-MCNC: 543 MG/DL — HIGH (ref 200–445)
FIBRINOGEN PPP-MCNC: 600 MG/DL — HIGH (ref 200–445)
GAS PNL BLDA: SIGNIFICANT CHANGE UP
GAS PNL BLDA: SIGNIFICANT CHANGE UP
GLUCOSE SERPL-MCNC: 111 MG/DL — HIGH (ref 70–99)
GLUCOSE SERPL-MCNC: 153 MG/DL — HIGH (ref 70–99)
GLUCOSE SERPL-MCNC: 396 MG/DL — HIGH (ref 70–99)
GLUCOSE UR QL: NEGATIVE — SIGNIFICANT CHANGE UP
HCO3 BLDA-SCNC: 22 MMOL/L — SIGNIFICANT CHANGE UP (ref 21–28)
HCO3 BLDA-SCNC: 22 MMOL/L — SIGNIFICANT CHANGE UP (ref 21–28)
HCT VFR BLD CALC: 31.1 % — LOW (ref 39–50)
HGB BLD-MCNC: 9.4 G/DL — LOW (ref 13–17)
HYALINE CASTS # UR AUTO: 6 /LPF — SIGNIFICANT CHANGE UP (ref 0–7)
INR BLD: 1.28 RATIO — HIGH (ref 0.88–1.16)
INR BLD: 1.29 RATIO — HIGH (ref 0.88–1.16)
KETONES UR-MCNC: NEGATIVE — SIGNIFICANT CHANGE UP
LDH SERPL L TO P-CCNC: 364 U/L — HIGH (ref 50–242)
LDH SERPL L TO P-CCNC: 374 U/L — HIGH (ref 50–242)
LEUKOCYTE ESTERASE UR-ACNC: ABNORMAL
MAGNESIUM SERPL-MCNC: 2 MG/DL — SIGNIFICANT CHANGE UP (ref 1.6–2.6)
MAGNESIUM SERPL-MCNC: 2.1 MG/DL — SIGNIFICANT CHANGE UP (ref 1.6–2.6)
MCHC RBC-ENTMCNC: 28.8 PG — SIGNIFICANT CHANGE UP (ref 27–34)
MCHC RBC-ENTMCNC: 30.2 GM/DL — LOW (ref 32–36)
MCV RBC AUTO: 95.4 FL — SIGNIFICANT CHANGE UP (ref 80–100)
NITRITE UR-MCNC: NEGATIVE — SIGNIFICANT CHANGE UP
NRBC # BLD: 0 /100 WBCS — SIGNIFICANT CHANGE UP (ref 0–0)
PCO2 BLDA: 42 MMHG — SIGNIFICANT CHANGE UP (ref 35–48)
PCO2 BLDA: 45 MMHG — SIGNIFICANT CHANGE UP (ref 35–48)
PH BLDA: 7.29 — LOW (ref 7.35–7.45)
PH BLDA: 7.33 — LOW (ref 7.35–7.45)
PH UR: 6.5 — SIGNIFICANT CHANGE UP (ref 5–8)
PHOSPHATE SERPL-MCNC: 3.7 MG/DL — SIGNIFICANT CHANGE UP (ref 2.5–4.5)
PLATELET # BLD AUTO: 170 K/UL — SIGNIFICANT CHANGE UP (ref 150–400)
PO2 BLDA: 109 MMHG — HIGH (ref 83–108)
PO2 BLDA: 131 MMHG — HIGH (ref 83–108)
POTASSIUM SERPL-MCNC: 4.3 MMOL/L — SIGNIFICANT CHANGE UP (ref 3.5–5.3)
POTASSIUM SERPL-MCNC: 4.3 MMOL/L — SIGNIFICANT CHANGE UP (ref 3.5–5.3)
POTASSIUM SERPL-MCNC: 4.7 MMOL/L — SIGNIFICANT CHANGE UP (ref 3.5–5.3)
POTASSIUM SERPL-SCNC: 4.3 MMOL/L — SIGNIFICANT CHANGE UP (ref 3.5–5.3)
POTASSIUM SERPL-SCNC: 4.3 MMOL/L — SIGNIFICANT CHANGE UP (ref 3.5–5.3)
POTASSIUM SERPL-SCNC: 4.7 MMOL/L — SIGNIFICANT CHANGE UP (ref 3.5–5.3)
PROT SERPL-MCNC: 5.5 G/DL — LOW (ref 6–8.3)
PROT SERPL-MCNC: 5.7 G/DL — LOW (ref 6–8.3)
PROT SERPL-MCNC: 5.8 G/DL — LOW (ref 6–8.3)
PROT UR-MCNC: ABNORMAL
PROTHROM AB SERPL-ACNC: 14.7 SEC — HIGH (ref 10.5–13.4)
PROTHROM AB SERPL-ACNC: 14.9 SEC — HIGH (ref 10.5–13.4)
RBC # BLD: 3.26 M/UL — LOW (ref 4.2–5.8)
RBC # FLD: 15.8 % — HIGH (ref 10.3–14.5)
RBC CASTS # UR COMP ASSIST: 531 /HPF — HIGH (ref 0–4)
SAO2 % BLDA: 97 % — SIGNIFICANT CHANGE UP (ref 94–98)
SAO2 % BLDA: 97.5 % — SIGNIFICANT CHANGE UP (ref 94–98)
SODIUM SERPL-SCNC: 157 MMOL/L — HIGH (ref 135–145)
SODIUM SERPL-SCNC: 159 MMOL/L — HIGH (ref 135–145)
SODIUM SERPL-SCNC: 160 MMOL/L — CRITICAL HIGH (ref 135–145)
SP GR SPEC: >1.05 (ref 1.01–1.02)
TROPONIN T, HIGH SENSITIVITY RESULT: 339 NG/L — HIGH (ref 0–51)
UROBILINOGEN FLD QL: NEGATIVE — SIGNIFICANT CHANGE UP
WBC # BLD: 22.54 K/UL — HIGH (ref 3.8–10.5)
WBC # FLD AUTO: 22.54 K/UL — HIGH (ref 3.8–10.5)
WBC UR QL: 86 /HPF — HIGH (ref 0–5)

## 2022-11-22 PROCEDURE — 76700 US EXAM ABDOM COMPLETE: CPT | Mod: 26

## 2022-11-22 PROCEDURE — 95720 EEG PHY/QHP EA INCR W/VEEG: CPT

## 2022-11-22 PROCEDURE — 99497 ADVNCD CARE PLAN 30 MIN: CPT | Mod: 25

## 2022-11-22 PROCEDURE — 71045 X-RAY EXAM CHEST 1 VIEW: CPT | Mod: 26

## 2022-11-22 PROCEDURE — 71045 X-RAY EXAM CHEST 1 VIEW: CPT | Mod: 26,77

## 2022-11-22 PROCEDURE — 99291 CRITICAL CARE FIRST HOUR: CPT

## 2022-11-22 PROCEDURE — 99233 SBSQ HOSP IP/OBS HIGH 50: CPT

## 2022-11-22 RX ORDER — PHENYLEPHRINE HYDROCHLORIDE 10 MG/ML
0.1 INJECTION INTRAVENOUS
Qty: 40 | Refills: 0 | Status: DISCONTINUED | OUTPATIENT
Start: 2022-11-22 | End: 2022-11-22

## 2022-11-22 RX ORDER — SODIUM CHLORIDE 9 MG/ML
500 INJECTION, SOLUTION INTRAVENOUS
Refills: 0 | Status: DISCONTINUED | OUTPATIENT
Start: 2022-11-22 | End: 2022-11-23

## 2022-11-22 RX ORDER — NOREPINEPHRINE BITARTRATE/D5W 8 MG/250ML
1.2 PLASTIC BAG, INJECTION (ML) INTRAVENOUS
Qty: 32 | Refills: 0 | Status: DISCONTINUED | OUTPATIENT
Start: 2022-11-22 | End: 2022-11-23

## 2022-11-22 RX ORDER — VASOPRESSIN 20 [USP'U]/ML
0.02 INJECTION INTRAVENOUS
Qty: 40 | Refills: 0 | Status: DISCONTINUED | OUTPATIENT
Start: 2022-11-22 | End: 2022-11-22

## 2022-11-22 RX ORDER — PIPERACILLIN AND TAZOBACTAM 4; .5 G/20ML; G/20ML
3.38 INJECTION, POWDER, LYOPHILIZED, FOR SOLUTION INTRAVENOUS ONCE
Refills: 0 | Status: DISCONTINUED | OUTPATIENT
Start: 2022-11-22 | End: 2022-11-22

## 2022-11-22 RX ORDER — SODIUM CHLORIDE 9 MG/ML
500 INJECTION, SOLUTION INTRAVENOUS
Refills: 0 | Status: DISCONTINUED | OUTPATIENT
Start: 2022-11-22 | End: 2022-11-22

## 2022-11-22 RX ORDER — SODIUM CHLORIDE 9 MG/ML
1000 INJECTION, SOLUTION INTRAVENOUS
Refills: 0 | Status: DISCONTINUED | OUTPATIENT
Start: 2022-11-22 | End: 2022-11-23

## 2022-11-22 RX ORDER — SODIUM BICARBONATE 1 MEQ/ML
50 SYRINGE (ML) INTRAVENOUS ONCE
Refills: 0 | Status: COMPLETED | OUTPATIENT
Start: 2022-11-22 | End: 2022-11-22

## 2022-11-22 RX ORDER — SODIUM CHLORIDE 9 MG/ML
1000 INJECTION, SOLUTION INTRAVENOUS
Refills: 0 | Status: DISCONTINUED | OUTPATIENT
Start: 2022-11-22 | End: 2022-11-22

## 2022-11-22 RX ORDER — VASOPRESSIN 20 [USP'U]/ML
0.04 INJECTION INTRAVENOUS
Qty: 40 | Refills: 0 | Status: DISCONTINUED | OUTPATIENT
Start: 2022-11-22 | End: 2022-11-23

## 2022-11-22 RX ORDER — PIPERACILLIN AND TAZOBACTAM 4; .5 G/20ML; G/20ML
3.38 INJECTION, POWDER, LYOPHILIZED, FOR SOLUTION INTRAVENOUS ONCE
Refills: 0 | Status: COMPLETED | OUTPATIENT
Start: 2022-11-22 | End: 2022-11-22

## 2022-11-22 RX ORDER — LEVOTHYROXINE SODIUM 125 MCG
20 TABLET ORAL
Qty: 200 | Refills: 0 | Status: DISCONTINUED | OUTPATIENT
Start: 2022-11-22 | End: 2022-11-23

## 2022-11-22 RX ORDER — PHENYLEPHRINE HYDROCHLORIDE 10 MG/ML
5 INJECTION INTRAVENOUS
Qty: 160 | Refills: 0 | Status: DISCONTINUED | OUTPATIENT
Start: 2022-11-22 | End: 2022-11-23

## 2022-11-22 RX ORDER — SODIUM CHLORIDE 9 MG/ML
500 INJECTION INTRAMUSCULAR; INTRAVENOUS; SUBCUTANEOUS ONCE
Refills: 0 | Status: COMPLETED | OUTPATIENT
Start: 2022-11-22 | End: 2022-11-22

## 2022-11-22 RX ORDER — LEVOTHYROXINE SODIUM 125 MCG
20 TABLET ORAL ONCE
Refills: 0 | Status: COMPLETED | OUTPATIENT
Start: 2022-11-22 | End: 2022-11-22

## 2022-11-22 RX ORDER — PIPERACILLIN AND TAZOBACTAM 4; .5 G/20ML; G/20ML
3.38 INJECTION, POWDER, LYOPHILIZED, FOR SOLUTION INTRAVENOUS
Refills: 0 | Status: DISCONTINUED | OUTPATIENT
Start: 2022-11-22 | End: 2022-11-23

## 2022-11-22 RX ORDER — NOREPINEPHRINE BITARTRATE/D5W 8 MG/250ML
0.05 PLASTIC BAG, INJECTION (ML) INTRAVENOUS
Qty: 8 | Refills: 0 | Status: DISCONTINUED | OUTPATIENT
Start: 2022-11-22 | End: 2022-11-22

## 2022-11-22 RX ORDER — NOREPINEPHRINE BITARTRATE/D5W 8 MG/250ML
0.05 PLASTIC BAG, INJECTION (ML) INTRAVENOUS
Qty: 16 | Refills: 0 | Status: DISCONTINUED | OUTPATIENT
Start: 2022-11-22 | End: 2022-11-22

## 2022-11-22 RX ADMIN — POLYETHYLENE GLYCOL 3350 17 GRAM(S): 17 POWDER, FOR SOLUTION ORAL at 05:19

## 2022-11-22 RX ADMIN — PIPERACILLIN AND TAZOBACTAM 200 GRAM(S): 4; .5 INJECTION, POWDER, LYOPHILIZED, FOR SOLUTION INTRAVENOUS at 17:26

## 2022-11-22 RX ADMIN — PHENYLEPHRINE HYDROCHLORIDE 63.1 MICROGRAM(S)/KG/MIN: 10 INJECTION INTRAVENOUS at 21:37

## 2022-11-22 RX ADMIN — SODIUM CHLORIDE 500 MILLILITER(S): 9 INJECTION, SOLUTION INTRAVENOUS at 21:15

## 2022-11-22 RX ADMIN — Medication 3.16 MICROGRAM(S)/KG/MIN: at 12:29

## 2022-11-22 RX ADMIN — Medication 75.7 MICROGRAM(S)/KG/MIN: at 21:38

## 2022-11-22 RX ADMIN — SENNA PLUS 15 MILLILITER(S): 8.6 TABLET ORAL at 21:40

## 2022-11-22 RX ADMIN — PIPERACILLIN AND TAZOBACTAM 200 GRAM(S): 4; .5 INJECTION, POWDER, LYOPHILIZED, FOR SOLUTION INTRAVENOUS at 23:48

## 2022-11-22 RX ADMIN — Medication 50 MICROGRAM(S)/HR: at 14:30

## 2022-11-22 RX ADMIN — Medication 125 MICROGRAM(S): at 05:19

## 2022-11-22 RX ADMIN — SODIUM CHLORIDE 75 MILLILITER(S): 9 INJECTION INTRAMUSCULAR; INTRAVENOUS; SUBCUTANEOUS at 09:59

## 2022-11-22 RX ADMIN — POLYETHYLENE GLYCOL 3350 17 GRAM(S): 17 POWDER, FOR SOLUTION ORAL at 17:25

## 2022-11-22 RX ADMIN — CHLORHEXIDINE GLUCONATE 15 MILLILITER(S): 213 SOLUTION TOPICAL at 05:19

## 2022-11-22 RX ADMIN — Medication 1 APPLICATION(S): at 05:19

## 2022-11-22 RX ADMIN — Medication 1 APPLICATION(S): at 21:41

## 2022-11-22 RX ADMIN — Medication 50 MILLIEQUIVALENT(S): at 20:45

## 2022-11-22 RX ADMIN — Medication 50 MICROGRAM(S)/HR: at 21:38

## 2022-11-22 RX ADMIN — VASOPRESSIN 6 UNIT(S)/MIN: 20 INJECTION INTRAVENOUS at 21:38

## 2022-11-22 RX ADMIN — PHENYLEPHRINE HYDROCHLORIDE 2.52 MICROGRAM(S)/KG/MIN: 10 INJECTION INTRAVENOUS at 04:04

## 2022-11-22 RX ADMIN — Medication 3.16 MICROGRAM(S)/KG/MIN: at 10:00

## 2022-11-22 RX ADMIN — SODIUM CHLORIDE 1000 MILLILITER(S): 9 INJECTION INTRAMUSCULAR; INTRAVENOUS; SUBCUTANEOUS at 03:59

## 2022-11-22 RX ADMIN — SODIUM CHLORIDE 100 MILLILITER(S): 9 INJECTION, SOLUTION INTRAVENOUS at 21:39

## 2022-11-22 RX ADMIN — Medication 1 APPLICATION(S): at 13:44

## 2022-11-22 RX ADMIN — PIPERACILLIN AND TAZOBACTAM 200 GRAM(S): 4; .5 INJECTION, POWDER, LYOPHILIZED, FOR SOLUTION INTRAVENOUS at 11:52

## 2022-11-22 RX ADMIN — CHLORHEXIDINE GLUCONATE 15 MILLILITER(S): 213 SOLUTION TOPICAL at 17:25

## 2022-11-22 RX ADMIN — LEVETIRACETAM 250 MILLIGRAM(S): 250 TABLET, FILM COATED ORAL at 17:25

## 2022-11-22 RX ADMIN — Medication 20 MICROGRAM(S): at 12:05

## 2022-11-22 RX ADMIN — LEVETIRACETAM 250 MILLIGRAM(S): 250 TABLET, FILM COATED ORAL at 05:19

## 2022-11-22 RX ADMIN — CHLORHEXIDINE GLUCONATE 1 APPLICATION(S): 213 SOLUTION TOPICAL at 21:40

## 2022-11-22 RX ADMIN — PHENYLEPHRINE HYDROCHLORIDE 2.52 MICROGRAM(S)/KG/MIN: 10 INJECTION INTRAVENOUS at 10:00

## 2022-11-22 RX ADMIN — Medication 116 MILLIGRAM(S): at 11:51

## 2022-11-22 RX ADMIN — PANTOPRAZOLE SODIUM 40 MILLIGRAM(S): 20 TABLET, DELAYED RELEASE ORAL at 12:29

## 2022-11-22 RX ADMIN — Medication 12.5 MICROGRAM(S)/HR: at 12:06

## 2022-11-22 RX ADMIN — Medication 25 MICROGRAM(S)/HR: at 13:00

## 2022-11-22 RX ADMIN — SODIUM CHLORIDE 100 MILLILITER(S): 9 INJECTION, SOLUTION INTRAVENOUS at 15:46

## 2022-11-22 NOTE — PROGRESS NOTE ADULT - ATTENDING COMMENTS
89 yo man admitted with severe TBI with extensive SAH.     On exam, overbreathing the vent when trigger set low.  At 3 pm would be 5 half-lives from fentanyl push, otherwise no sedating meds. Will reassess for progression to brain death.     Patient seen and examined by attending on 11/22/2022.    Patient is critically ill due to severe TBI and at high risk for neurological deterioration or death due to: poor and worsening neurologic exam, requiring mechanical ventilation 2/2 inability to protect airway.

## 2022-11-22 NOTE — CHART NOTE - NSCHARTNOTESELECT_GEN_ALL_CORE
Event Note
Late Entry: Palliative care/Event Note
Neurosurgery/Event Note
VTE Risk Assessment
eeg prelim

## 2022-11-22 NOTE — CHART NOTE - NSCHARTNOTEFT_GEN_A_CORE
As discussed with Julio on 11/21, patient family considering organ donation.    NSCU to monitor patient for possible progression to brain death.   Palliative to follow peripherally, As discussed with Julio on 11/21, patient family considering organ donation.    NSCU to monitor patient for possible progression to brain death.   Palliative to follow peripherally,  Addenedum: Called to NSCU, surrogate Brianda and Daysi wish to rescind DNR/DNI while awaiting  for possible progression to Brain Death.  Molst rescinded, NSCU team and  Julio aware.

## 2022-11-22 NOTE — PROGRESS NOTE ADULT - SUBJECTIVE AND OBJECTIVE BOX
SUMMARY: Reportedly 88yo man found at the bottom of stairs, brought to [facility], intubated, reportedly as SAH on CTH, neurosurgeon and radiologist concerned for aneurysmal SAH pattern, requested transfer. Daughter on her way to [facility] now, Brianda [phone]. Reportedly per EMS, in [facility] ED, received 1U pRBC and 60g mannitol.     overnight events: family agreed for DNR DNI, live on assessment in process    REVIEW OF SYSTEMS: [x] Unable to Assess due to neurologic exam   [ ] All ROS addressed below are non-contributory, except:  Neuro: [ ] Headache [ ] Back pain [ ] Numbness [ ] Weakness [ ] Ataxia [ ] Dizziness [ ] Aphasia [ ] Dysarthria [ ] Visual disturbance  Resp: [ ] Shortness of breath/dyspnea [ ] Orthopnea [ ] Cough  CV: [ ] Chest pain [ ] Palpitation [ ] Lightheadedness [ ] Syncope  Renal: [ ] Thirst [ ] Edema  GI: [ ] Nausea [ ] Emesis [ ] Abdominal pain [ ] Constipation [ ] Diarrhea  Hem: [ ] Hematemesis [ ] bBright red blood per rectum  ID: [ ] Fever [ ] Chills [ ] Dysuria  ENT: [ ] Rhinorrhea    chlorhexidine 0.12% Liquid 15 milliLiter(s) Oral Mucosa every 12 hours  chlorhexidine 4% Liquid 1 Application(s) Topical at bedtime  levETIRAcetam  Solution 250 milliGRAM(s) Oral two times a day  levothyroxine 125 MICROGram(s) Enteral Tube daily  norepinephrine Infusion 0.05 MICROgram(s)/kG/Min IV Continuous <Continuous>  pantoprazole  Injectable 40 milliGRAM(s) IV Push daily  petrolatum Ophthalmic Ointment 1 Application(s) Both EYES every 8 hours  polyethylene glycol 3350 17 Gram(s) Oral every 12 hours  senna Syrup 15 milliLiter(s) Oral at bedtime  sodium chloride 0.9%. 1000 milliLiter(s) IV Continuous <Continuous>  Mode: AC/ CMV (Assist Control/ Continuous Mandatory Ventilation), RR (machine): 18, TV (machine): 450, FiO2: 40, PEEP: 5, ITime: 1, MAP: 8, PIP: 14  IMAGING/DATA: [x] Reviewed      IVF FLUIDS/MEDICATIONS: [x] Reviewed    ALLERGIES: Allergies    Intolerances    DEVICES:   [] Restraints [x] PIVs [x] ET tube [] central line [] PICC [x] arterial line [x] nash [x] NGT/OGT [] EVD [] LD [] JAYDON/HMV [] LiCOX [] ICP monitor [] Trach [] PEG [] Chest Tube [] other:    EXAMINATION:  General: No acute distress  HEENT: Anicteric sclerae  Cardiac: [id number]  Lungs: Clear  Abdomen: Soft, non-tender, +BS  Extremities: No c/c/e  Neurologic: b/l periorbital bruising, in C collar, pupils 4mm NR, no EO to stim, LUE no movement, RUE ext to nox, BLE w spont mov, TF to nox SUMMARY: Reportedly 88yo man found at the bottom of stairs, brought to [facility], intubated, reportedly as SAH on CTH, neurosurgeon and radiologist concerned for aneurysmal SAH pattern, requested transfer. Daughter on her way to [facility] now, Brianda [phone]. Reportedly per EMS, in [facility] ED, received 1U pRBC and 60g mannitol.     overnight events:got fentanyl overnight, worsening exam but still breathing spontaneously when vent settings decreased. Was hypothermic yesterday. Per discussion w live on, family allowed a period of 24 h to evaluate for progression to brain death, after which pt would transition to PCU    REVIEW OF SYSTEMS: [x] Unable to Assess due to neurologic exam   [ ] All ROS addressed below are non-contributory, except:  Neuro: [ ] Headache [ ] Back pain [ ] Numbness [ ] Weakness [ ] Ataxia [ ] Dizziness [ ] Aphasia [ ] Dysarthria [ ] Visual disturbance  Resp: [ ] Shortness of breath/dyspnea [ ] Orthopnea [ ] Cough  CV: [ ] Chest pain [ ] Palpitation [ ] Lightheadedness [ ] Syncope  Renal: [ ] Thirst [ ] Edema  GI: [ ] Nausea [ ] Emesis [ ] Abdominal pain [ ] Constipation [ ] Diarrhea  Hem: [ ] Hematemesis [ ] bBright red blood per rectum  ID: [ ] Fever [ ] Chills [ ] Dysuria  ENT: [ ] Rhinorrhea    chlorhexidine 0.12% Liquid 15 milliLiter(s) Oral Mucosa every 12 hours  chlorhexidine 4% Liquid 1 Application(s) Topical at bedtime  levETIRAcetam  Solution 250 milliGRAM(s) Oral two times a day  levothyroxine 125 MICROGram(s) Enteral Tube daily  norepinephrine Infusion 0.05 MICROgram(s)/kG/Min IV Continuous <Continuous>  pantoprazole  Injectable 40 milliGRAM(s) IV Push daily  petrolatum Ophthalmic Ointment 1 Application(s) Both EYES every 8 hours  polyethylene glycol 3350 17 Gram(s) Oral every 12 hours  senna Syrup 15 milliLiter(s) Oral at bedtime  sodium chloride 0.9%. 1000 milliLiter(s) IV Continuous <Continuous>  Mode: AC/ CMV (Assist Control/ Continuous Mandatory Ventilation), RR (machine): 18, TV (machine): 450, FiO2: 40, PEEP: 5, ITime: 1, MAP: 8, PIP: 14  IMAGING/DATA: [x] Reviewed      IVF FLUIDS/MEDICATIONS: [x] Reviewed    ALLERGIES: Allergies    Intolerances    DEVICES:   [] Restraints [x] PIVs [x] ET tube [] central line [] PICC [x] arterial line [x] nash [x] NGT/OGT [] EVD [] LD [] JAYDON/HMV [] LiCOX [] ICP monitor [] Trach [] PEG [] Chest Tube [] other:    EXAMINATION:  General: No acute distress  HEENT: Anicteric sclerae  Cardiac: [id number]  Lungs: Clear  Abdomen: Soft, non-tender, +BS  Extremities: No c/c/e  Neurologic: b/l periorbital bruising, in C collar, pupils 4mm NR, no EO to stim, no mov to nox x4

## 2022-11-22 NOTE — PROGRESS NOTE ADULT - SUBJECTIVE AND OBJECTIVE BOX
SUMMARY: Reportedly 89yo man found at the bottom of stairs, brought to [facility], intubated, reportedly as SAH on CTH, neurosurgeon and radiologist concerned for aneurysmal SAH pattern, requested transfer. Daughter on her way to [facility] now, Brianda [phone]. Reportedly per EMS, in [facility] ED, received 1U pRBC and 60g mannitol.     overnight events:got fentanyl overnight, worsening exam but still breathing spontaneously when vent settings decreased. Was hypothermic yesterday. Per discussion w live on, family allowed a period of 24 h to evaluate for progression to brain death, after which pt would transition to PCU    REVIEW OF SYSTEMS: [x] Unable to Assess due to neurologic exam   [ ] All ROS addressed below are non-contributory, except:  Neuro: [ ] Headache [ ] Back pain [ ] Numbness [ ] Weakness [ ] Ataxia [ ] Dizziness [ ] Aphasia [ ] Dysarthria [ ] Visual disturbance  Resp: [ ] Shortness of breath/dyspnea [ ] Orthopnea [ ] Cough  CV: [ ] Chest pain [ ] Palpitation [ ] Lightheadedness [ ] Syncope  Renal: [ ] Thirst [ ] Edema  GI: [ ] Nausea [ ] Emesis [ ] Abdominal pain [ ] Constipation [ ] Diarrhea  Hem: [ ] Hematemesis [ ] bBright red blood per rectum  ID: [ ] Fever [ ] Chills [ ] Dysuria  ENT: [ ] Rhinorrhea    chlorhexidine 0.12% Liquid 15 milliLiter(s) Oral Mucosa every 12 hours  chlorhexidine 4% Liquid 1 Application(s) Topical at bedtime  levETIRAcetam  Solution 250 milliGRAM(s) Oral two times a day  levothyroxine 125 MICROGram(s) Enteral Tube daily  norepinephrine Infusion 0.05 MICROgram(s)/kG/Min IV Continuous <Continuous>  pantoprazole  Injectable 40 milliGRAM(s) IV Push daily  petrolatum Ophthalmic Ointment 1 Application(s) Both EYES every 8 hours  polyethylene glycol 3350 17 Gram(s) Oral every 12 hours  senna Syrup 15 milliLiter(s) Oral at bedtime  sodium chloride 0.9%. 1000 milliLiter(s) IV Continuous <Continuous>  Mode: AC/ CMV (Assist Control/ Continuous Mandatory Ventilation), RR (machine): 18, TV (machine): 450, FiO2: 40, PEEP: 5, ITime: 1, MAP: 8, PIP: 14  IMAGING/DATA: [x] Reviewed      IVF FLUIDS/MEDICATIONS: [x] Reviewed    ALLERGIES: Allergies    Intolerances    DEVICES:   [] Restraints [x] PIVs [x] ET tube [] central line [] PICC [x] arterial line [x] nash [x] NGT/OGT [] EVD [] LD [] JAYDON/HMV [] LiCOX [] ICP monitor [] Trach [] PEG [] Chest Tube [] other:    EXAMINATION:  General: No acute distress  HEENT: Anicteric sclerae  Cardiac: [id number]  Lungs: Clear  Abdomen: Soft, non-tender, +BS  Extremities: No c/c/e  Neurologic: b/l periorbital bruising, in C collar, pupils 4mm NR, no EO to stim, no mov to nox x4 SUMMARY: Reportedly 88yo man found at the bottom of stairs, brought to [facility], intubated, reportedly as SAH on CTH, neurosurgeon and radiologist concerned for aneurysmal SAH pattern, requested transfer. Daughter on her way to [facility] now, Brianda [phone]. Reportedly per EMS, in [facility] ED, received 1U pRBC and 60g mannitol.     overnight events 11/21 :got fentanyl overnight, worsening exam but still breathing spontaneously when vent settings decreased. Was hypothermic yesterday. Per discussion w live on, family allowed a period of 24 h to evaluate for progression to brain death, after which pt would transition to PCU    Overnight events 11/22: patient was very hypotensive, MAP in the 30s. Pressor requirements tripled, patient was started on vaso and a bolus of D5 was given as per live on. Family aware. Currently on 3 pressors. Examination remains poor.     REVIEW OF SYSTEMS: [x] Unable to Assess due to neurologic exam   [ ] All ROS addressed below are non-contributory, except:  Neuro: [ ] Headache [ ] Back pain [ ] Numbness [ ] Weakness [ ] Ataxia [ ] Dizziness [ ] Aphasia [ ] Dysarthria [ ] Visual disturbance  Resp: [ ] Shortness of breath/dyspnea [ ] Orthopnea [ ] Cough  CV: [ ] Chest pain [ ] Palpitation [ ] Lightheadedness [ ] Syncope  Renal: [ ] Thirst [ ] Edema  GI: [ ] Nausea [ ] Emesis [ ] Abdominal pain [ ] Constipation [ ] Diarrhea  Hem: [ ] Hematemesis [ ] bBright red blood per rectum  ID: [ ] Fever [ ] Chills [ ] Dysuria  ENT: [ ] Rhinorrhea    chlorhexidine 0.12% Liquid 15 milliLiter(s) Oral Mucosa every 12 hours  chlorhexidine 4% Liquid 1 Application(s) Topical at bedtime  levETIRAcetam  Solution 250 milliGRAM(s) Oral two times a day  levothyroxine 125 MICROGram(s) Enteral Tube daily  norepinephrine Infusion 0.05 MICROgram(s)/kG/Min IV Continuous <Continuous>  pantoprazole  Injectable 40 milliGRAM(s) IV Push daily  petrolatum Ophthalmic Ointment 1 Application(s) Both EYES every 8 hours  polyethylene glycol 3350 17 Gram(s) Oral every 12 hours  senna Syrup 15 milliLiter(s) Oral at bedtime  sodium chloride 0.9%. 1000 milliLiter(s) IV Continuous <Continuous>  Mode: AC/ CMV (Assist Control/ Continuous Mandatory Ventilation), RR (machine): 18, TV (machine): 450, FiO2: 40, PEEP: 5, ITime: 1, MAP: 8, PIP: 14  IMAGING/DATA: [x] Reviewed      IVF FLUIDS/MEDICATIONS: [x] Reviewed    ALLERGIES: Allergies    Intolerances    DEVICES:   [] Restraints [x] PIVs [x] ET tube [] central line [] PICC [x] arterial line [x] nash [x] NGT/OGT [] EVD [] LD [] JAYDON/HMV [] LiCOX [] ICP monitor [] Trach [] PEG [] Chest Tube [] other:    EXAMINATION:  General: No acute distress  HEENT: Anicteric sclerae, right eye proptotic and ecchymotic.   Cardiac: [id number]  Lungs: Clear  Abdomen: Soft, non-tender, +BS  Extremities: No c/c/e  Neurologic: b/l periorbital bruising, in C collar, pupils 4mm NR, no EO to stim, no cough, no gag, no dolls, intermittently overbreathing the vent, no mov to nox x4

## 2022-11-22 NOTE — PROGRESS NOTE ADULT - ASSESSMENT
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, extensive skull and face fractures    NEURO:  nchecks q4 coma  poor prognosis   live on to assess for 24 h if brain death, if not will proceed w PCU plan will end around 7 pm tonight  meanwhile will optimize for potential organ w levo and steroids IV   CTH/CTA findings reviewed, C1 fracture  vimpat 50mg bid for seizure ppx but has BBB, switch to keppra renally dosed   holding nimodipine given on pressors  CT C spine  C collar at all times for now  family refused nsgy intervention  Normonatremia  bmp q8h for now  Activity: [] mobilize as tolerated [x] Bedrest [] PT [] OT [] PMNR    PULM:  acute respiratory failure   intubated,   18/450/5/40%    CV: elevated troponin , likely demand ischemia   SBP goal 100-180 for now, traumatic   EKG LBBB ( old vs new?)  R fem a line from OSH  hypotension on levo , amanda will add synthroid and steroids    RENAL:  Fluids: IVF ns 75cc  Barnes exchanged here  keep net even  cr worsening, monitor    GI:  Diet: NPO, OGT  GI prophylaxis [] not indicated [x] PPI [] other:  Bowel regimen [] colace [x] senna [x] other: miraalx     ENDO:   Goal euglycemia (-180)  TFT wnl  reportedly hx hypothyroid,  resume synthroid po 125    HEME/ONC:  VTE prophylaxis: [x] SCDs [] chemoprophylaxis [x] hold chemoprophylaxis due to:  SAH [] high risk of DVT/PE on admission due to:    ID: monitor for fevers     MISC: trauma consulted, signed off given plans for PCU    SOCIAL/FAMILY:  [x] awaiting [] updated at bedside [] family meeting    CODE STATUS:  [] Full Code [x] DNR [x] DNI [] Palliative/Comfort Care    DISPOSITION:  [x] ICU [] Stroke Unit [] Floor [] EMU [] RCU [] PCU    [x] Patient is at high risk of neurologic deterioration/death due to: respiratory failure requiring intubation, SAH       Contact: [phone] 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, extensive skull and face fractures    NEURO:  nchecks q4 coma  poor prognosis   live on to assess for 24 h if brain death, optimize for potential organ w levo and steroids IV  CTH/CTA findings reviewed, C1 fracture  vimpat 50mg bid for seizure ppx but has BBB, switch to keppra renally dosed   holding nimodipine given on pressors  CT C spine  C collar at all times for now  family refused nsgy intervention  Normonatremia bmp q8h for now  Activity: [] mobilize as tolerated [x] Bedrest [] PT [] OT [] PMNR    PULM:  acute respiratory failure   intubated,   18/450/5/40%    CV: elevated troponin , likely demand ischemia   SBP goal 100-180 for now, traumatic   EKG LBBB ( old vs new?)  R fem a line from OSH  hypotension on levo , amanda will add synthroid and steroids    RENAL:  Fluids: IVF 1/2 ns 100cc, will reduce to 50 cc as the patient is positive 2 L balance  keep net even  cr worsening, monitor    GI:  Diet: NPO, OGT  GI prophylaxis [] not indicated [x] PPI [] other:  Bowel regimen [] colace [x] senna [x] other: miralax     ENDO:   Goal euglycemia (-180)  TFT wnl  reportedly hx hypothyroid,  resume synthroid po 200mcg    HEME/ONC:  H/H stable  VTE prophylaxis: [x] SCDs [] chemoprophylaxis [x] hold chemoprophylaxis due to:  SAH [] high risk of DVT/PE on admission due to:    ID: monitor for fevers, Elevated leukocyte count  On Zosyn per Live On     MISC: trauma consulted, signed off given plans for PCU    SOCIAL/FAMILY:  [x] awaiting [] updated at bedside [] family meeting    CODE STATUS:  [] Full Code [x] DNR [x] DNI [] Palliative/Comfort Care    DISPOSITION:  [x] ICU [] Stroke Unit [] Floor [] EMU [] RCU [] PCU    [x] Patient is at high risk of neurologic deterioration/death due to: respiratory failure requiring intubation, SAH       Contact: [phone]

## 2022-11-22 NOTE — PROGRESS NOTE ADULT - ASSESSMENT
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, extensive skull and face fractures    NEURO:  nchecks q4 coma  poor prognosis will continue GOC  notify live on   CTH/CTA findings reviewed, C1 fracture  vimpat 50mg bid for seizure ppx but has BBB, switch to keppra renally dosed   holding nimodipine given on pressors  CT C spine  C collar at all times for now  poor prognosis will discuss w family, consult palliative  family refused nsgy intervention  Normonatremia  bmp q8h  Activity: [] mobilize as tolerated [x] Bedrest [] PT [] OT [] PMNR    PULM:  acute respiratory failure   intubated,   18/450/5/40%  pull back et 2 cm as it is in the right bronchus     CV: elevated troponin , likely demand ischemia   SBP goal 100-180 for now, traumatic   EKG LBBB ( old vs new?)  TTE pending  R fem a line from OSH  hypotension on levo     RENAL:  Fluids: IVF ns 75cc  Barnes exchanged here  keep net even    GI:  Diet: NPO, OGT  GI prophylaxis [] not indicated [x] PPI [] other:  Bowel regimen [] colace [x] senna [x] other: miraalx     ENDO:   Goal euglycemia (-180)  TFT wnl  reportedly hx hypothyroid,  resume synthroid po 125    HEME/ONC:  VTE prophylaxis: [x] SCDs [] chemoprophylaxis [x] hold chemoprophylaxis due to:  SAH [] high risk of DVT/PE on admission due to:    ID: monitor for fevers     MISC: trauma consulted, to follow,reported fall down the stairs    SOCIAL/FAMILY:  [x] awaiting [] updated at bedside [] family meeting    CODE STATUS:  [x] Full Code [] DNR [] DNI [] Palliative/Comfort Care    DISPOSITION:  [x] ICU [] Stroke Unit [] Floor [] EMU [] RCU [] PCU    [x] Patient is at high risk of neurologic deterioration/death due to: respiratory failure requiring intubation, SAH       Contact: [phone] 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, extensive skull and face fractures    NEURO:  nchecks q4 coma  poor prognosis   live on to assess for 24 h if brain death, if not will proceed w PCU plan will end around 7 pm tonight  meanwhile will optimize for potential organ w levo and steroids IV   CTH/CTA findings reviewed, C1 fracture  vimpat 50mg bid for seizure ppx but has BBB, switch to keppra renally dosed   holding nimodipine given on pressors  CT C spine  C collar at all times for now  family refused nsgy intervention  Normonatremia  bmp q8h for now  Activity: [] mobilize as tolerated [x] Bedrest [] PT [] OT [] PMNR    PULM:  acute respiratory failure   intubated,   18/450/5/40%    CV: elevated troponin , likely demand ischemia   SBP goal 100-180 for now, traumatic   EKG LBBB ( old vs new?)  R fem a line from OSH  hypotension on levo , amanda will add synthroid and steroids    RENAL:  Fluids: IVF ns 75cc  Barnes exchanged here  keep net even  cr worsening, monitor    GI:  Diet: NPO, OGT  GI prophylaxis [] not indicated [x] PPI [] other:  Bowel regimen [] colace [x] senna [x] other: miraalx     ENDO:   Goal euglycemia (-180)  TFT wnl  reportedly hx hypothyroid,  resume synthroid po 125    HEME/ONC:  VTE prophylaxis: [x] SCDs [] chemoprophylaxis [x] hold chemoprophylaxis due to:  SAH [] high risk of DVT/PE on admission due to:    ID: monitor for fevers     MISC: trauma consulted, signed off given plans for PCU    SOCIAL/FAMILY:  [x] awaiting [] updated at bedside [] family meeting    CODE STATUS:  [] Full Code [x] DNR [x] DNI [] Palliative/Comfort Care    DISPOSITION:  [x] ICU [] Stroke Unit [] Floor [] EMU [] RCU [] PCU    [x] Patient is at high risk of neurologic deterioration/death due to: respiratory failure requiring intubation, SAH       Contact: [phone]

## 2022-11-22 NOTE — EEG REPORT - NS EEG TEXT BOX
AMBROSIO HURT [facility]-[id number]     Study Date: 11-21-22 10:18AM to 11-22-22 0800AM   Duration: 21:15 hrs   --------------------------------------------------------------------------------------------------  History:  CC/ HPI Patient is a 89y old  Male who presents with a chief complaint of S/p trauma (22 Nov 2022 06:37)    MEDICATIONS  (STANDING):  chlorhexidine 0.12% Liquid 15 milliLiter(s) Oral Mucosa every 12 hours  chlorhexidine 4% Liquid 1 Application(s) Topical at bedtime  levETIRAcetam  Solution 250 milliGRAM(s) Oral two times a day  levothyroxine 125 MICROGram(s) Enteral Tube daily  norepinephrine Infusion 0.05 MICROgram(s)/kG/Min (3.16 mL/Hr) IV Continuous <Continuous>  pantoprazole  Injectable 40 milliGRAM(s) IV Push daily  petrolatum Ophthalmic Ointment 1 Application(s) Both EYES every 8 hours  phenylephrine    Infusion 0.1 MICROgram(s)/kG/Min (2.52 mL/Hr) IV Continuous <Continuous>  polyethylene glycol 3350 17 Gram(s) Oral every 12 hours  senna Syrup 15 milliLiter(s) Oral at bedtime  sodium chloride 0.9%. 1000 milliLiter(s) (75 mL/Hr) IV Continuous <Continuous>    --------------------------------------------------------------------------------------------------  Study Interpretation:    [Abbreviation Key:  PDR=alpha rhythm/posterior dominant rhythm. A-P=anterior posterior.  Amplitude: ‘very low’:<20; ‘low’:20-49; ‘medium’:; ‘high’:>150uV.  Persistence for periodic/rhythmic patterns (% of epoch) ‘rare’:<1%; ‘occasional’:1-10%; ‘frequent’:10-50%; ‘abundant’:50-90%; ‘continuous’:>90%.  Persistence for sporadic discharges: ‘rare’:<1/hr; ‘occasional’:1/min-1/hr; ‘frequent’:>1/min; ‘abundant’:>1/10 sec.  RPP=rhythmic and periodic patterns; GRDA=generalized rhythmic delta activity; FIRDA=frontal intermittent GRDA; LRDA=lateralized rhythmic delta activity; TIRDA=temporal intermittent rhythmic delta activity;  LPD=PLED=lateralized periodic discharges; GPD=generalized periodic discharges; BIPDs =bilateral independent periodic discharges; Mf=multifocal; SIRPDs=stimulus induced rhythmic, periodic, or ictal appearing discharges; BIRDs=brief potentially ictal rhythmic discharges >4 Hz, lasting .5-10s; PFA (paroxysmal bursts >13 Hz or =8 Hz <10s).  Modifiers: +F=with fast component; +S=with spike component; +R=with rhythmic component.  S-B=burst suppression pattern.  Max=maximal. N1-drowsy; N2-stage II sleep; N3-slow wave sleep. SSS/BETS=small sharp spikes/benign epileptiform transients of sleep. HV=hyperventilation; PS=photic stimulation]    Daily EEG Visual Analysis  FINDINGS:      Background:  Continuity: attenuated <5mv, absent alpha activity   Symmetry: symmetric  PDR: absent     Reactivity: absent   Voltage: low <5mv   Anterior Posterior Gradient: absent   Other background findings: none  Breach: absent    Background Slowing:  Generalized slowing: attenuated <5mv  Focal slowing: none was present.    State Changes:   -absent     Sporadic Epileptiform Discharges:    None    Rhythmic and Periodic Patterns (RPPs):  None     Electrographic and Electroclinical seizures:  None    Other Clinical Events:  None    Activation Procedures:   -Hyperventilation was not performed.    -Photic stimulation was not performed.      Artifacts:  Intermittent myogenic and movement artifacts were noted.    ECG:  The heart rate on single channel ECG was predominantly between  BPM.    EEG Classification / Summary:  Abnormal EEG study  Attenuation, <5mv, absent alpha activity   Severe generalized background slowing    -----------------------------------------------------------------------------------------------------    Clinical Impression:  Severe diffuse/multifocal cerebral dysfunction  There were no epileptiform abnormalities recorded.      In absence of additional clinical concerns, recommend consideration for discontinuation of current EEG study with reconnection in future if warranted.    ------------------------------------------------------------------------------------------------------  Fam Storey MD  Epilepsy Fellow , [facility]  Department of Neurology, [facility] [employer]    [facility] EEG Reading Room Ph#: [phone]  Epilepsy Answering Service after 5PM and before 8:30AM: Ph#: [phone]   AMBROSIO HURT [facility]-[id number]     Study Date: 11-21-22 10:18AM to 11-22-22 0800AM   Duration: 21:15 hrs   --------------------------------------------------------------------------------------------------  History:  CC/ HPI Patient is a 89y old  Male who presents with a chief complaint of S/p trauma (22 Nov 2022 06:37)    MEDICATIONS  (STANDING):  chlorhexidine 0.12% Liquid 15 milliLiter(s) Oral Mucosa every 12 hours  chlorhexidine 4% Liquid 1 Application(s) Topical at bedtime  levETIRAcetam  Solution 250 milliGRAM(s) Oral two times a day  levothyroxine 125 MICROGram(s) Enteral Tube daily  norepinephrine Infusion 0.05 MICROgram(s)/kG/Min (3.16 mL/Hr) IV Continuous <Continuous>  pantoprazole  Injectable 40 milliGRAM(s) IV Push daily  petrolatum Ophthalmic Ointment 1 Application(s) Both EYES every 8 hours  phenylephrine    Infusion 0.1 MICROgram(s)/kG/Min (2.52 mL/Hr) IV Continuous <Continuous>  polyethylene glycol 3350 17 Gram(s) Oral every 12 hours  senna Syrup 15 milliLiter(s) Oral at bedtime  sodium chloride 0.9%. 1000 milliLiter(s) (75 mL/Hr) IV Continuous <Continuous>    --------------------------------------------------------------------------------------------------  Study Interpretation:    [Abbreviation Key:  PDR=alpha rhythm/posterior dominant rhythm. A-P=anterior posterior.  Amplitude: ‘very low’:<20; ‘low’:20-49; ‘medium’:; ‘high’:>150uV.  Persistence for periodic/rhythmic patterns (% of epoch) ‘rare’:<1%; ‘occasional’:1-10%; ‘frequent’:10-50%; ‘abundant’:50-90%; ‘continuous’:>90%.  Persistence for sporadic discharges: ‘rare’:<1/hr; ‘occasional’:1/min-1/hr; ‘frequent’:>1/min; ‘abundant’:>1/10 sec.  RPP=rhythmic and periodic patterns; GRDA=generalized rhythmic delta activity; FIRDA=frontal intermittent GRDA; LRDA=lateralized rhythmic delta activity; TIRDA=temporal intermittent rhythmic delta activity;  LPD=PLED=lateralized periodic discharges; GPD=generalized periodic discharges; BIPDs =bilateral independent periodic discharges; Mf=multifocal; SIRPDs=stimulus induced rhythmic, periodic, or ictal appearing discharges; BIRDs=brief potentially ictal rhythmic discharges >4 Hz, lasting .5-10s; PFA (paroxysmal bursts >13 Hz or =8 Hz <10s).  Modifiers: +F=with fast component; +S=with spike component; +R=with rhythmic component.  S-B=burst suppression pattern.  Max=maximal. N1-drowsy; N2-stage II sleep; N3-slow wave sleep. SSS/BETS=small sharp spikes/benign epileptiform transients of sleep. HV=hyperventilation; PS=photic stimulation]    Daily EEG Visual Analysis  FINDINGS:      Background:  Continuity: attenuated <5mv, absent alpha activity   Symmetry: symmetric  PDR: absent     Reactivity: absent   Voltage: low <5mv   Anterior Posterior Gradient: absent   Other background findings: none  Breach: absent    Background Slowing:  Generalized slowing: attenuated <5mv  Focal slowing: none was present.    State Changes:   -absent     Sporadic Epileptiform Discharges:    None    Rhythmic and Periodic Patterns (RPPs):  None     Electrographic and Electroclinical seizures:  None    Other Clinical Events:  None    Activation Procedures:   -Hyperventilation was not performed.    -Photic stimulation was not performed.      Artifacts:  Intermittent myogenic and movement artifacts were noted.    ECG:  The heart rate on single channel ECG was predominantly between  BPM.    EEG Classification / Summary:  Abnormal EEG study  Severe attenuation, no appreciable cerebral activity    -----------------------------------------------------------------------------------------------------    Clinical Impression:  Severe diffuse cerebral dysfunction  There were no epileptiform abnormalities recorded.    In absence of additional clinical concerns, recommend consideration for discontinuation of current EEG study     ------------------------------------------------------------------------------------------------------  Fam Storey MD  Epilepsy Fellow , [facility]  Department of Neurology, [facility] [employer]    [facility] EEG Reading Room Ph#: [phone]  Epilepsy Answering Service after 5PM and before 8:30AM: Ph#: [phone]

## 2022-11-23 LAB
ALBUMIN SERPL ELPH-MCNC: 2.6 G/DL — LOW (ref 3.3–5)
ALBUMIN SERPL ELPH-MCNC: 2.6 G/DL — LOW (ref 3.3–5)
ALP SERPL-CCNC: 74 U/L — SIGNIFICANT CHANGE UP (ref 40–120)
ALP SERPL-CCNC: 88 U/L — SIGNIFICANT CHANGE UP (ref 40–120)
ALT FLD-CCNC: 47 U/L — HIGH (ref 10–45)
ALT FLD-CCNC: 48 U/L — HIGH (ref 10–45)
ANION GAP SERPL CALC-SCNC: 8 MMOL/L — SIGNIFICANT CHANGE UP (ref 5–17)
ANION GAP SERPL CALC-SCNC: 9 MMOL/L — SIGNIFICANT CHANGE UP (ref 5–17)
APTT BLD: 28.5 SEC — SIGNIFICANT CHANGE UP (ref 27.5–35.5)
APTT BLD: 29.2 SEC — SIGNIFICANT CHANGE UP (ref 27.5–35.5)
APTT BLD: 29.3 SEC — SIGNIFICANT CHANGE UP (ref 27.5–35.5)
AST SERPL-CCNC: 64 U/L — HIGH (ref 10–40)
AST SERPL-CCNC: 66 U/L — HIGH (ref 10–40)
BILIRUB DIRECT SERPL-MCNC: 0.1 MG/DL — SIGNIFICANT CHANGE UP (ref 0–0.3)
BILIRUB DIRECT SERPL-MCNC: 0.1 MG/DL — SIGNIFICANT CHANGE UP (ref 0–0.3)
BILIRUB SERPL-MCNC: 0.4 MG/DL — SIGNIFICANT CHANGE UP (ref 0.2–1.2)
BILIRUB SERPL-MCNC: 0.5 MG/DL — SIGNIFICANT CHANGE UP (ref 0.2–1.2)
BUN SERPL-MCNC: 34 MG/DL — HIGH (ref 7–23)
BUN SERPL-MCNC: 35 MG/DL — HIGH (ref 7–23)
CALCIUM SERPL-MCNC: 7.8 MG/DL — LOW (ref 8.4–10.5)
CALCIUM SERPL-MCNC: 8.2 MG/DL — LOW (ref 8.4–10.5)
CHLORIDE SERPL-SCNC: 128 MMOL/L — HIGH (ref 96–108)
CHLORIDE SERPL-SCNC: 128 MMOL/L — HIGH (ref 96–108)
CO2 SERPL-SCNC: 21 MMOL/L — LOW (ref 22–31)
CO2 SERPL-SCNC: 23 MMOL/L — SIGNIFICANT CHANGE UP (ref 22–31)
CREAT SERPL-MCNC: 2.34 MG/DL — HIGH (ref 0.5–1.3)
CREAT SERPL-MCNC: 2.4 MG/DL — HIGH (ref 0.5–1.3)
EGFR: 25 ML/MIN/1.73M2 — LOW
EGFR: 26 ML/MIN/1.73M2 — LOW
FIBRINOGEN PPP-MCNC: 652 MG/DL — HIGH (ref 200–445)
FIBRINOGEN PPP-MCNC: 734 MG/DL — HIGH (ref 200–445)
FIBRINOGEN PPP-MCNC: 742 MG/DL — HIGH (ref 200–445)
GAS PNL BLDA: SIGNIFICANT CHANGE UP
GGT SERPL-CCNC: 16 U/L — SIGNIFICANT CHANGE UP (ref 9–50)
GGT SERPL-CCNC: 24 U/L — SIGNIFICANT CHANGE UP (ref 9–50)
GGT SERPL-CCNC: 25 U/L — SIGNIFICANT CHANGE UP (ref 9–50)
GGT SERPL-CCNC: 27 U/L — SIGNIFICANT CHANGE UP (ref 9–50)
GLUCOSE BLDC GLUCOMTR-MCNC: 173 MG/DL — HIGH (ref 70–99)
GLUCOSE BLDC GLUCOMTR-MCNC: 194 MG/DL — HIGH (ref 70–99)
GLUCOSE BLDC GLUCOMTR-MCNC: 220 MG/DL — HIGH (ref 70–99)
GLUCOSE BLDC GLUCOMTR-MCNC: 230 MG/DL — HIGH (ref 70–99)
GLUCOSE BLDC GLUCOMTR-MCNC: 266 MG/DL — HIGH (ref 70–99)
GLUCOSE BLDC GLUCOMTR-MCNC: 331 MG/DL — HIGH (ref 70–99)
GLUCOSE BLDC GLUCOMTR-MCNC: 344 MG/DL — HIGH (ref 70–99)
GLUCOSE BLDC GLUCOMTR-MCNC: 350 MG/DL — HIGH (ref 70–99)
GLUCOSE SERPL-MCNC: 201 MG/DL — HIGH (ref 70–99)
GLUCOSE SERPL-MCNC: 327 MG/DL — HIGH (ref 70–99)
HCT VFR BLD CALC: 29.6 % — LOW (ref 39–50)
HCT VFR BLD CALC: 30.7 % — LOW (ref 39–50)
HGB BLD-MCNC: 9 G/DL — LOW (ref 13–17)
HGB BLD-MCNC: 9 G/DL — LOW (ref 13–17)
INR BLD: 1.37 RATIO — HIGH (ref 0.88–1.16)
INR BLD: 1.44 RATIO — HIGH (ref 0.88–1.16)
INR BLD: 1.51 RATIO — HIGH (ref 0.88–1.16)
LDH SERPL L TO P-CCNC: 401 U/L — HIGH (ref 50–242)
LDH SERPL L TO P-CCNC: 580 U/L — HIGH (ref 50–242)
MAGNESIUM SERPL-MCNC: 2.1 MG/DL — SIGNIFICANT CHANGE UP (ref 1.6–2.6)
MAGNESIUM SERPL-MCNC: 2.2 MG/DL — SIGNIFICANT CHANGE UP (ref 1.6–2.6)
MCHC RBC-ENTMCNC: 28.8 PG — SIGNIFICANT CHANGE UP (ref 27–34)
MCHC RBC-ENTMCNC: 29.3 GM/DL — LOW (ref 32–36)
MCHC RBC-ENTMCNC: 29.3 PG — SIGNIFICANT CHANGE UP (ref 27–34)
MCHC RBC-ENTMCNC: 30.4 GM/DL — LOW (ref 32–36)
MCV RBC AUTO: 96.4 FL — SIGNIFICANT CHANGE UP (ref 80–100)
MCV RBC AUTO: 98.4 FL — SIGNIFICANT CHANGE UP (ref 80–100)
NRBC # BLD: 0 /100 WBCS — SIGNIFICANT CHANGE UP (ref 0–0)
NRBC # BLD: 0 /100 WBCS — SIGNIFICANT CHANGE UP (ref 0–0)
PHOSPHATE SERPL-MCNC: 2.6 MG/DL — SIGNIFICANT CHANGE UP (ref 2.5–4.5)
PHOSPHATE SERPL-MCNC: 3.2 MG/DL — SIGNIFICANT CHANGE UP (ref 2.5–4.5)
PLATELET # BLD AUTO: 138 K/UL — LOW (ref 150–400)
PLATELET # BLD AUTO: 154 K/UL — SIGNIFICANT CHANGE UP (ref 150–400)
POTASSIUM SERPL-MCNC: 3.9 MMOL/L — SIGNIFICANT CHANGE UP (ref 3.5–5.3)
POTASSIUM SERPL-MCNC: 4.1 MMOL/L — SIGNIFICANT CHANGE UP (ref 3.5–5.3)
POTASSIUM SERPL-SCNC: 3.9 MMOL/L — SIGNIFICANT CHANGE UP (ref 3.5–5.3)
POTASSIUM SERPL-SCNC: 4.1 MMOL/L — SIGNIFICANT CHANGE UP (ref 3.5–5.3)
PROT SERPL-MCNC: 5.4 G/DL — LOW (ref 6–8.3)
PROT SERPL-MCNC: 5.6 G/DL — LOW (ref 6–8.3)
PROTHROM AB SERPL-ACNC: 15.8 SEC — HIGH (ref 10.5–13.4)
PROTHROM AB SERPL-ACNC: 16.6 SEC — HIGH (ref 10.5–13.4)
PROTHROM AB SERPL-ACNC: 17.6 SEC — HIGH (ref 10.5–13.4)
PSA FLD-MCNC: 254 NG/ML — HIGH (ref 0–4)
RBC # BLD: 3.07 M/UL — LOW (ref 4.2–5.8)
RBC # BLD: 3.12 M/UL — LOW (ref 4.2–5.8)
RBC # FLD: 15.8 % — HIGH (ref 10.3–14.5)
RBC # FLD: 15.9 % — HIGH (ref 10.3–14.5)
SODIUM SERPL-SCNC: 158 MMOL/L — HIGH (ref 135–145)
SODIUM SERPL-SCNC: 159 MMOL/L — HIGH (ref 135–145)
TROPONIN T, HIGH SENSITIVITY RESULT: 619 NG/L — HIGH (ref 0–51)
WBC # BLD: 23.18 K/UL — HIGH (ref 3.8–10.5)
WBC # BLD: 26.24 K/UL — HIGH (ref 3.8–10.5)
WBC # FLD AUTO: 23.18 K/UL — HIGH (ref 3.8–10.5)
WBC # FLD AUTO: 26.24 K/UL — HIGH (ref 3.8–10.5)

## 2022-11-23 PROCEDURE — 99291 CRITICAL CARE FIRST HOUR: CPT

## 2022-11-23 PROCEDURE — 95718 EEG PHYS/QHP 2-12 HR W/VEEG: CPT

## 2022-11-23 PROCEDURE — 71045 X-RAY EXAM CHEST 1 VIEW: CPT | Mod: 26

## 2022-11-23 RX ORDER — INSULIN HUMAN 100 [IU]/ML
3 INJECTION, SOLUTION SUBCUTANEOUS
Qty: 100 | Refills: 0 | Status: DISCONTINUED | OUTPATIENT
Start: 2022-11-23 | End: 2022-11-23

## 2022-11-23 RX ORDER — DEXTROSE 50 % IN WATER 50 %
50 SYRINGE (ML) INTRAVENOUS
Refills: 0 | Status: DISCONTINUED | OUTPATIENT
Start: 2022-11-23 | End: 2022-11-23

## 2022-11-23 RX ORDER — MORPHINE SULFATE 50 MG/1
4 CAPSULE, EXTENDED RELEASE ORAL
Refills: 0 | Status: DISCONTINUED | OUTPATIENT
Start: 2022-11-23 | End: 2022-11-23

## 2022-11-23 RX ORDER — ROBINUL 0.2 MG/ML
0.4 INJECTION INTRAMUSCULAR; INTRAVENOUS EVERY 4 HOURS
Refills: 0 | Status: DISCONTINUED | OUTPATIENT
Start: 2022-11-23 | End: 2022-11-23

## 2022-11-23 RX ORDER — SODIUM BICARBONATE 1 MEQ/ML
50 SYRINGE (ML) INTRAVENOUS ONCE
Refills: 0 | Status: COMPLETED | OUTPATIENT
Start: 2022-11-23 | End: 2022-11-23

## 2022-11-23 RX ORDER — MORPHINE SULFATE 50 MG/1
2 CAPSULE, EXTENDED RELEASE ORAL
Refills: 0 | Status: DISCONTINUED | OUTPATIENT
Start: 2022-11-23 | End: 2022-11-23

## 2022-11-23 RX ORDER — PIPERACILLIN AND TAZOBACTAM 4; .5 G/20ML; G/20ML
3.38 INJECTION, POWDER, LYOPHILIZED, FOR SOLUTION INTRAVENOUS EVERY 12 HOURS
Refills: 0 | Status: DISCONTINUED | OUTPATIENT
Start: 2022-11-23 | End: 2022-11-23

## 2022-11-23 RX ORDER — INSULIN LISPRO 100/ML
VIAL (ML) SUBCUTANEOUS EVERY 6 HOURS
Refills: 0 | Status: DISCONTINUED | OUTPATIENT
Start: 2022-11-23 | End: 2022-11-23

## 2022-11-23 RX ORDER — PIPERACILLIN AND TAZOBACTAM 4; .5 G/20ML; G/20ML
3.38 INJECTION, POWDER, LYOPHILIZED, FOR SOLUTION INTRAVENOUS EVERY 6 HOURS
Refills: 0 | Status: DISCONTINUED | OUTPATIENT
Start: 2022-11-23 | End: 2022-11-23

## 2022-11-23 RX ADMIN — MORPHINE SULFATE 2 MILLIGRAM(S): 50 CAPSULE, EXTENDED RELEASE ORAL at 13:05

## 2022-11-23 RX ADMIN — ROBINUL 0.4 MILLIGRAM(S): 0.2 INJECTION INTRAMUSCULAR; INTRAVENOUS at 13:05

## 2022-11-23 RX ADMIN — LEVETIRACETAM 250 MILLIGRAM(S): 250 TABLET, FILM COATED ORAL at 05:04

## 2022-11-23 RX ADMIN — PIPERACILLIN AND TAZOBACTAM 25 GRAM(S): 4; .5 INJECTION, POWDER, LYOPHILIZED, FOR SOLUTION INTRAVENOUS at 05:44

## 2022-11-23 RX ADMIN — Medication 50 MILLIEQUIVALENT(S): at 06:45

## 2022-11-23 RX ADMIN — Medication 1 APPLICATION(S): at 05:05

## 2022-11-23 RX ADMIN — MORPHINE SULFATE 2 MILLIGRAM(S): 50 CAPSULE, EXTENDED RELEASE ORAL at 13:39

## 2022-11-23 RX ADMIN — VASOPRESSIN 6 UNIT(S)/MIN: 20 INJECTION INTRAVENOUS at 07:53

## 2022-11-23 RX ADMIN — SODIUM CHLORIDE 50 MILLILITER(S): 9 INJECTION, SOLUTION INTRAVENOUS at 07:53

## 2022-11-23 RX ADMIN — Medication 8: at 05:05

## 2022-11-23 RX ADMIN — POLYETHYLENE GLYCOL 3350 17 GRAM(S): 17 POWDER, FOR SOLUTION ORAL at 05:04

## 2022-11-23 RX ADMIN — Medication 50 MICROGRAM(S)/HR: at 07:53

## 2022-11-23 RX ADMIN — CHLORHEXIDINE GLUCONATE 15 MILLILITER(S): 213 SOLUTION TOPICAL at 05:04

## 2022-11-23 RX ADMIN — INSULIN HUMAN 3 UNIT(S)/HR: 100 INJECTION, SOLUTION SUBCUTANEOUS at 08:00

## 2022-11-23 RX ADMIN — Medication 75.7 MICROGRAM(S)/KG/MIN: at 07:53

## 2022-11-23 RX ADMIN — Medication 116 MILLIGRAM(S): at 05:04

## 2022-11-23 RX ADMIN — Medication 75.7 MICROGRAM(S)/KG/MIN: at 10:33

## 2022-11-23 RX ADMIN — Medication 0.5 MILLIGRAM(S): at 13:05

## 2022-11-23 RX ADMIN — PHENYLEPHRINE HYDROCHLORIDE 63.1 MICROGRAM(S)/KG/MIN: 10 INJECTION INTRAVENOUS at 07:53

## 2022-11-23 NOTE — PROGRESS NOTE ADULT - ATTENDING COMMENTS
Agree with/edited as appropriate above  spoke with two daughters at bedside, still triggers breaths on ventilator, on three pressors, insulin gtt, requiring synthroid, decision made for compassionate extubation, Live On informed, PCU following, DNR/DNI

## 2022-11-23 NOTE — PROVIDER CONTACT NOTE (OTHER) - ACTION/TREATMENT ORDERED:
Pressors titrated down, MD Mac aware of titrations. ABG drawn, 1amp of bicarb administered as per order.

## 2022-11-23 NOTE — DIETITIAN INITIAL EVALUATION ADULT - ENTERAL
Initiate Glucerna 1.2 @ 20mL/hr and titrate up 10mL Q6 hrs until goal rate of 70mL/hr x 24 hrs, provides 1680 mL formula/day, 2016 kcals/day (30 kcal/kg based on current wt 67.3kg), 100g protein/day (~1.5g pro/kg based on current wt 67.3kg), 1352mL free water; defer free water flushes to team. If EN feeds warranted consider initiating Glucerna 1.2 @ 20mL/hr and titrate up 10mL Q6 hrs until goal rate of 70mL/hr x 24 hrs, provides 1680 mL formula/day, 2016 kcals/day (30 kcal/kg based on current wt 67.3kg), 100g protein/day (~1.5g pro/kg based on current wt 67.3kg), 1352mL free water; defer free water flushes to team.

## 2022-11-23 NOTE — DIETITIAN INITIAL EVALUATION ADULT - REASON FOR ADMISSION
Aneurysm    Per chart, this is an 89-year-old male with a PMHx of hypothyroidism, presented to [facility] s/p traumatic fall down 10-12 stairs, found via CT to have extensive SAH, was intubated and transferred to [facility] for further management.

## 2022-11-23 NOTE — PROGRESS NOTE ADULT - ASSESSMENT
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, extensive skull and face fractures  Live On candidate    NEURO:  nchecks q4 coma  poor prognosis   live on to assess within 24 h if brain death, optimize for potential organ w levo and steroids IV  CTH/CTA findings reviewed, C1 fracture  vimpat 50mg bid for seizure ppx but has BBB, switch to keppra renally dosed   holding nimodipine given on pressors  CT C spine  C collar at all times for now  family refused nsgy intervention  Normonatremia bmp q8h for now  Activity: [] mobilize as tolerated [x] Bedrest [] PT [] OT [] PMNR    PULM:  acute respiratory failure   intubated,   18/450/5/40%    CV: elevated troponin , likely demand ischemia   SBP goal 100-180 for now, traumatic   EKG LBBB ( old vs new?)  R fem a line from OSH  hypotension on levo , amanda will add synthroid and steroids    RENAL:  Fluids: IVF 1/2 ns 50 cc as the patient is positive 2 L balance  keep net even  cr worsening, monitor  giving bicarb amp for slight met acidosis    GI:  Diet: NPO, OGT  GI prophylaxis [] not indicated [x] PPI [] other:  Bowel regimen [] colace [x] senna [x] other: miralax     ENDO:   Goal euglycemia (-180)  TFT wnl  reportedly hx hypothyroid, synthroid IV, ins gtt for steroid induced hyperglycemia    HEME/ONC:  H/H stable  VTE prophylaxis: [x] SCDs [] chemoprophylaxis [x] hold chemoprophylaxis due to:  SAH [] high risk of DVT/PE on admission due to:    ID: monitor for fevers, Elevated leukocyte count  On Zosyn per Live On     MISC: trauma consulted, signed off given plans for PCU    SOCIAL/FAMILY:  [x] awaiting [] updated at bedside [] family meeting    CODE STATUS:  [] Full Code DNR DNI rescinded by family for Live on assessment, will continue mgmt with both family and live on to determine ultimate plan (donation vs PCU) [] Palliative/Comfort Care    DISPOSITION:  [x] ICU [] Stroke Unit [] Floor [] EMU [] RCU [] PCU    [x] Patient is at high risk of neurologic deterioration/death due to: respiratory failure requiring intubation, SAH       Contact: [phone] 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, extensive skull and face fractures  Live On candidate    NEURO:  nchecks q12 coma  poor prognosis   live on to assess within 24 h if brain death, optimize for potential organ w levo and steroids IV  CTH/CTA findings reviewed, C1 fracture  vimpat 50mg bid for seizure ppx but has BBB, switch to keppra renally dosed   holding nimodipine given on pressors  CT C spine  C collar at all times for now  family refused nsgy intervention  Normonatremia bmp q8h for now  Activity: [] mobilize as tolerated [x] Bedrest [] PT [] OT [] PMNR    PULM:  acute respiratory failure   intubated,   18/450/5/40%    CV: elevated troponin , likely demand ischemia   SBP goal 100-180 for now, traumatic   EKG LBBB ( old vs new?)  R fem a line from OSH  hypotension on levo , amanda will add synthroid and steroids, wean amanda to off    RENAL:  Fluids: IVF 1/2 ns 50 cc as the patient is positive 2 L balance  keep net even  cr worsening, monitor  bladder scan given hematuria and decr urine output  giving bicarb amp for slight met acidosis    GI:  Diet: NPO, OGT  GI prophylaxis [] not indicated [x] PPI [] other:  Bowel regimen [] colace [x] senna [x] other: miralax     ENDO:   Goal euglycemia (-180)  TFT wnl  reportedly hx hypothyroid, synthroid IV, ins gtt for steroid induced hyperglycemia    HEME/ONC:  H/H stable  VTE prophylaxis: [x] SCDs [] chemoprophylaxis [x] hold chemoprophylaxis due to:  SAH [] high risk of DVT/PE on admission due to:    ID: monitor for fevers, Elevated leukocyte count  On Zosyn per Live On     MISC: trauma consulted, signed off given plans for PCU    SOCIAL/FAMILY:  [x] awaiting [] updated at bedside [] family meeting    CODE STATUS:  [] Full Code DNR DNI rescinded by family for Live on assessment, will continue mgmt with both family and live on to determine ultimate plan (donation vs PCU) [] Palliative/Comfort Care    DISPOSITION:  [x] ICU [] Stroke Unit [] Floor [] EMU [] RCU [] PCU    [x] Patient is at high risk of neurologic deterioration/death due to: respiratory failure requiring intubation, SAH       Contact: [phone] 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, extensive skull and face fractures  Live On candidate    NEURO:  nchecks q12 coma  poor prognosis   live on to assess within 24 h if brain death, optimize for potential organ w levo and steroids IV  CTH/CTA findings reviewed, C1 fracture  vimpat 50mg bid for seizure ppx but has BBB, switch to keppra renally dosed   holding nimodipine given on pressors  CT C spine  C collar at all times for now  family refused nsgy intervention  Normonatremia bmp q8h for now  Activity: [] mobilize as tolerated [x] Bedrest [] PT [] OT [] PMNR    PULM:  acute respiratory failure   intubated,   18/450/5/40%    CV: elevated troponin , likely demand ischemia   SBP goal 100-180 for now, traumatic   EKG LBBB ( old vs new?)  R fem a line from OSH  hypotension on levo , amanda will add synthroid and steroids, wean amanda to off    RENAL:  Fluids: IVF 1/2 ns 50 cc as the patient is positive 2 L balance  keep net even  cr worsening, monitor  bladder scan given hematuria and decr urine output  giving bicarb amp for slight met acidosis    GI:  Diet: NPO, OGT  GI prophylaxis [] not indicated [x] PPI [] other:  Bowel regimen [] colace [x] senna [x] other: miralax     ENDO:   Goal euglycemia (-180)  TFT wnl  reportedly hx hypothyroid, synthroid IV, ins gtt for steroid induced hyperglycemia    HEME/ONC:  H/H stable  VTE prophylaxis: [x] SCDs [] chemoprophylaxis [x] hold chemoprophylaxis due to:  SAH [] high risk of DVT/PE on admission due to:    ID: monitor for fevers, Elevated leukocyte count  On Zosyn per Live On     MISC: trauma consulted, signed off given plans for PCU    SOCIAL/FAMILY:  [x] awaiting [] updated at bedside [] family meeting    CODE STATUS:  [] Full Code DNR DNI rescinded by family for Live on assessment, will continue mgmt with both family and live on to determine ultimate plan (donation vs PCU) [] Palliative/Comfort Care    DISPOSITION:  [x] ICU [] Stroke Unit [] Floor [] EMU [] RCU [] PCU    [x] Patient is at high risk of neurologic deterioration/death due to: respiratory failure requiring intubation, SAH

## 2022-11-23 NOTE — AIRWAY REMOVAL NOTE  ADULT & PEDS - ARTIFICAL AIRWAY REMOVAL COMMENTS
Written order for extubation verified. The patient was identified by full name and birth date compared to the identification band. Present during the procedure was  Dr Jones.

## 2022-11-23 NOTE — DIETITIAN INITIAL EVALUATION ADULT - NSFNSGIIOFT_GEN_A_CORE
I&O's Detail    22 Nov 2022 07:01  -  23 Nov 2022 07:00  --------------------------------------------------------  IN:    dextrose 5%: 1000 mL    dextrose 5%: 500 mL    Enteral Tube Flush: 60 mL    IV PiggyBack: 300 mL    Levothyroxine (Organ Donation): 25 mL    Levothyroxine (Organ Donation): 850 mL    Levothyroxine (Organ Donation): 12.5 mL    Norepinephrine: 318.5 mL    Norepinephrine: 986.1 mL    Phenylephrine: 538.4 mL    Phenylephrine: 578.9 mL    sodium chloride 0.45%: 900 mL    sodium chloride 0.45%: 400 mL    sodium chloride 0.9%: 450 mL    Vasopressin: 66 mL  Total IN: 6985.4 mL    OUT:    Indwelling Catheter - Urethral (mL): 2905 mL  Total OUT: 2905 mL    Total NET: 4080.4 mL      23 Nov 2022 07:01  -  23 Nov 2022 09:46  --------------------------------------------------------  IN:    Insulin: 6 mL    Levothyroxine (Organ Donation): 100 mL    Norepinephrine: 7.6 mL    Phenylephrine: 20.1 mL    sodium chloride 0.45%: 100 mL    Vasopressin: 12 mL  Total IN: 245.7 mL    OUT:    Indwelling Catheter - Urethral (mL): 0 mL  Total OUT: 0 mL    Total NET: 245.7 mL

## 2022-11-23 NOTE — PROGRESS NOTE ADULT - TIME BILLING
See acute respiratory failure where the procedure of compassionate extubation is described. That procedure took a total of 40' while preparing, coordinating and f/u post extubation.

## 2022-11-23 NOTE — PROVIDER CONTACT NOTE (OTHER) - BACKGROUND
pt adm from OSH with difuse sub arachnoid hemorrhage
pt adm tx from OSH with difuse sub arachnoid hemorrhage
pt. s/p fall with SAH
s/p fall w/ SAH

## 2022-11-23 NOTE — DIETITIAN INITIAL EVALUATION ADULT - OTHER INFO
No wt hx per chart, dosing wt this admission 148 pounds (11/20), latest wt 148 pounds (11/23 - bedscale) per flowsheets. No height noted per chart.    Noted with hypernatremia, team to monitor and continue trending labs.  Latest A1C 6.2% (11.20), serum glucose 327mg/dL, noted pt on solu-medrol likely attributing to hyperglycemia, continues on regular infusion insulin, team to continue trending BG.  Continues on synthroid in setting of hx of hypothyroidism.    Of note, pt hemodynamically unstable, MAP in 30s last night 11/23 per NSICU attending note 11/23.

## 2022-11-23 NOTE — EEG REPORT - NS EEG TEXT BOX
AMBROSIO HURT [facility]-[id number]     Study Date: 11-22-22 08:00AM to 11-23-22 0800AM   Duration: 24 hrs   --------------------------------------------------------------------------------------------------  History:  CC/ HPI Patient is a 89y old  Male who presents with a chief complaint of S/p trauma (22 Nov 2022 06:37)    MEDICATIONS  (STANDING):  levETIRAcetam  Solution 250 milliGRAM(s) Oral two times a day  levothyroxine 125 MICROGram(s) Enteral Tube daily  norepinephrine Infusion 0.05 MICROgram(s)/kG/Min (3.16 mL/Hr) IV Continuous <Continuous>  pantoprazole  Injectable 40 milliGRAM(s) IV Push daily  petrolatum Ophthalmic Ointment 1 Application(s) Both EYES every 8 hours  phenylephrine    Infusion 0.1 MICROgram(s)/kG/Min (2.52 mL/Hr) IV Continuous <Continuous>  polyethylene glycol 3350 17 Gram(s) Oral every 12 hours  senna Syrup 15 milliLiter(s) Oral at bedtime  sodium chloride 0.9%. 1000 milliLiter(s) (75 mL/Hr) IV Continuous <Continuous>    --------------------------------------------------------------------------------------------------  Study Interpretation:    [Abbreviation Key:  PDR=alpha rhythm/posterior dominant rhythm. A-P=anterior posterior.  Amplitude: ‘very low’:<20; ‘low’:20-49; ‘medium’:; ‘high’:>150uV.  Persistence for periodic/rhythmic patterns (% of epoch) ‘rare’:<1%; ‘occasional’:1-10%; ‘frequent’:10-50%; ‘abundant’:50-90%; ‘continuous’:>90%.  Persistence for sporadic discharges: ‘rare’:<1/hr; ‘occasional’:1/min-1/hr; ‘frequent’:>1/min; ‘abundant’:>1/10 sec.  RPP=rhythmic and periodic patterns; GRDA=generalized rhythmic delta activity; FIRDA=frontal intermittent GRDA; LRDA=lateralized rhythmic delta activity; TIRDA=temporal intermittent rhythmic delta activity;  LPD=PLED=lateralized periodic discharges; GPD=generalized periodic discharges; BIPDs =bilateral independent periodic discharges; Mf=multifocal; SIRPDs=stimulus induced rhythmic, periodic, or ictal appearing discharges; BIRDs=brief potentially ictal rhythmic discharges >4 Hz, lasting .5-10s; PFA (paroxysmal bursts >13 Hz or =8 Hz <10s).  Modifiers: +F=with fast component; +S=with spike component; +R=with rhythmic component.  S-B=burst suppression pattern.  Max=maximal. N1-drowsy; N2-stage II sleep; N3-slow wave sleep. SSS/BETS=small sharp spikes/benign epileptiform transients of sleep. HV=hyperventilation; PS=photic stimulation]    Daily EEG Visual Analysis  FINDINGS:      Background:  Continuity: attenuated <5mv, absent alpha activity   Symmetry: symmetric  PDR: absent     Reactivity: absent   Voltage: low <5mv   Anterior Posterior Gradient: absent   Other background findings: none  Breach: absent    Background Slowing:  Generalized slowing: attenuated <5mv  Focal slowing: none was present.    State Changes:   -absent     Sporadic Epileptiform Discharges:    None    Rhythmic and Periodic Patterns (RPPs):  None     Electrographic and Electroclinical seizures:  None    Other Clinical Events:  None    Activation Procedures:   -Hyperventilation was not performed.    -Photic stimulation was not performed.      Artifacts:  Intermittent myogenic and movement artifacts were noted.    ECG:  The heart rate on single channel ECG was predominantly between 100-150 BPM.    EEG Classification / Summary:  Abnormal EEG study  Severe attenuation, no appreciable cerebral activity    -----------------------------------------------------------------------------------------------------    Clinical Impression:  Severe diffuse cerebral dysfunction  There were no epileptiform abnormalities recorded.    In absence of additional clinical concerns, recommend consideration for discontinuation of current EEG study     ------------------------------------------------------------------------------------------------------  Fam Storey MD  Epilepsy Fellow , [facility]  Department of Neurology, [facility]    [facility] EEG Reading Room Ph#: [phone]  Epilepsy Answering Service after 5PM and before 8:30AM: Ph#: [phone]   AMBROSIO HURT [facility]-[id number]     Study Date: 11-22-22 08:00AM to 11-23-22 1535PM   Duration: 31 hrs 34min  --------------------------------------------------------------------------------------------------  History:  CC/ HPI Patient is a 89y old  Male who presents with a chief complaint of S/p trauma (22 Nov 2022 06:37)    MEDICATIONS  (STANDING):  levETIRAcetam  Solution 250 milliGRAM(s) Oral two times a day  levothyroxine 125 MICROGram(s) Enteral Tube daily  norepinephrine Infusion 0.05 MICROgram(s)/kG/Min (3.16 mL/Hr) IV Continuous <Continuous>  pantoprazole  Injectable 40 milliGRAM(s) IV Push daily  petrolatum Ophthalmic Ointment 1 Application(s) Both EYES every 8 hours  phenylephrine    Infusion 0.1 MICROgram(s)/kG/Min (2.52 mL/Hr) IV Continuous <Continuous>  polyethylene glycol 3350 17 Gram(s) Oral every 12 hours  senna Syrup 15 milliLiter(s) Oral at bedtime  sodium chloride 0.9%. 1000 milliLiter(s) (75 mL/Hr) IV Continuous <Continuous>    --------------------------------------------------------------------------------------------------  Study Interpretation:    [Abbreviation Key:  PDR=alpha rhythm/posterior dominant rhythm. A-P=anterior posterior.  Amplitude: ‘very low’:<20; ‘low’:20-49; ‘medium’:; ‘high’:>150uV.  Persistence for periodic/rhythmic patterns (% of epoch) ‘rare’:<1%; ‘occasional’:1-10%; ‘frequent’:10-50%; ‘abundant’:50-90%; ‘continuous’:>90%.  Persistence for sporadic discharges: ‘rare’:<1/hr; ‘occasional’:1/min-1/hr; ‘frequent’:>1/min; ‘abundant’:>1/10 sec.  RPP=rhythmic and periodic patterns; GRDA=generalized rhythmic delta activity; FIRDA=frontal intermittent GRDA; LRDA=lateralized rhythmic delta activity; TIRDA=temporal intermittent rhythmic delta activity;  LPD=PLED=lateralized periodic discharges; GPD=generalized periodic discharges; BIPDs =bilateral independent periodic discharges; Mf=multifocal; SIRPDs=stimulus induced rhythmic, periodic, or ictal appearing discharges; BIRDs=brief potentially ictal rhythmic discharges >4 Hz, lasting .5-10s; PFA (paroxysmal bursts >13 Hz or =8 Hz <10s).  Modifiers: +F=with fast component; +S=with spike component; +R=with rhythmic component.  S-B=burst suppression pattern.  Max=maximal. N1-drowsy; N2-stage II sleep; N3-slow wave sleep. SSS/BETS=small sharp spikes/benign epileptiform transients of sleep. HV=hyperventilation; PS=photic stimulation]    Daily EEG Visual Analysis  FINDINGS:      Background:  Continuity: attenuated <5mv, absent alpha activity   Symmetry: symmetric  PDR: absent     Reactivity: absent   Voltage: low <5mv   Anterior Posterior Gradient: absent   Other background findings: none  Breach: absent    Background Slowing:  Generalized slowing: attenuated <5mv  Focal slowing: none was present.    State Changes:   -absent     Sporadic Epileptiform Discharges:    None    Rhythmic and Periodic Patterns (RPPs):  None     Electrographic and Electroclinical seizures:  None    Other Clinical Events:  None    Activation Procedures:   -Hyperventilation was not performed.    -Photic stimulation was not performed.      Artifacts:  Intermittent myogenic and movement artifacts were noted.    ECG:  The heart rate on single channel ECG was predominantly between 100-150 BPM.    EEG Classification / Summary:  Abnormal EEG study  Severe attenuation, no appreciable cerebral activity    -----------------------------------------------------------------------------------------------------    Clinical Impression:  Severe diffuse cerebral dysfunction  Bradycardia at 1340pm, absence of activity on EKG lead after 1345pm  There were no epileptiform abnormalities recorded.    In absence of additional clinical concerns, recommend consideration for discontinuation of current EEG study     ------------------------------------------------------------------------------------------------------  Fam Storey MD  Epilepsy Fellow , [facility]  Department of Neurology, [facility]    [facility] EEG Reading Room Ph#: [phone]  Epilepsy Answering Service after 5PM and before 8:30AM: Ph#: [phone]   AMBROSIO HURT [facility]-[id number]     Study Date: 11-22-22 08:00AM to 11-23-22 1535PM   Duration: 31 hrs 34min  --------------------------------------------------------------------------------------------------  History:  CC/ HPI Patient is a 89y old  Male who presents with a chief complaint of S/p trauma (22 Nov 2022 06:37)    MEDICATIONS  (STANDING):  levETIRAcetam  Solution 250 milliGRAM(s) Oral two times a day  levothyroxine 125 MICROGram(s) Enteral Tube daily  norepinephrine Infusion 0.05 MICROgram(s)/kG/Min (3.16 mL/Hr) IV Continuous <Continuous>  pantoprazole  Injectable 40 milliGRAM(s) IV Push daily  petrolatum Ophthalmic Ointment 1 Application(s) Both EYES every 8 hours  phenylephrine    Infusion 0.1 MICROgram(s)/kG/Min (2.52 mL/Hr) IV Continuous <Continuous>  polyethylene glycol 3350 17 Gram(s) Oral every 12 hours  senna Syrup 15 milliLiter(s) Oral at bedtime  sodium chloride 0.9%. 1000 milliLiter(s) (75 mL/Hr) IV Continuous <Continuous>    --------------------------------------------------------------------------------------------------  Study Interpretation:    [Abbreviation Key:  PDR=alpha rhythm/posterior dominant rhythm. A-P=anterior posterior.  Amplitude: ‘very low’:<20; ‘low’:20-49; ‘medium’:; ‘high’:>150uV.  Persistence for periodic/rhythmic patterns (% of epoch) ‘rare’:<1%; ‘occasional’:1-10%; ‘frequent’:10-50%; ‘abundant’:50-90%; ‘continuous’:>90%.  Persistence for sporadic discharges: ‘rare’:<1/hr; ‘occasional’:1/min-1/hr; ‘frequent’:>1/min; ‘abundant’:>1/10 sec.  RPP=rhythmic and periodic patterns; GRDA=generalized rhythmic delta activity; FIRDA=frontal intermittent GRDA; LRDA=lateralized rhythmic delta activity; TIRDA=temporal intermittent rhythmic delta activity;  LPD=PLED=lateralized periodic discharges; GPD=generalized periodic discharges; BIPDs =bilateral independent periodic discharges; Mf=multifocal; SIRPDs=stimulus induced rhythmic, periodic, or ictal appearing discharges; BIRDs=brief potentially ictal rhythmic discharges >4 Hz, lasting .5-10s; PFA (paroxysmal bursts >13 Hz or =8 Hz <10s).  Modifiers: +F=with fast component; +S=with spike component; +R=with rhythmic component.  S-B=burst suppression pattern.  Max=maximal. N1-drowsy; N2-stage II sleep; N3-slow wave sleep. SSS/BETS=small sharp spikes/benign epileptiform transients of sleep. HV=hyperventilation; PS=photic stimulation]    Daily EEG Visual Analysis  FINDINGS:      Background:  Continuity: attenuated <5mv, absent alpha activity   Symmetry: symmetric  PDR: absent     Reactivity: absent   Voltage: low <5mv   Anterior Posterior Gradient: absent   Other background findings: none  Breach: absent    Background Slowing:  Generalized slowing: attenuated <5mv  Focal slowing: none was present.    State Changes:   -absent     Sporadic Epileptiform Discharges:    None    Rhythmic and Periodic Patterns (RPPs):  None     Electrographic and Electroclinical seizures:  None    Other Clinical Events:  None    Activation Procedures:   -Hyperventilation was not performed.    -Photic stimulation was not performed.      Artifacts:  Intermittent myogenic and movement artifacts were noted.    ECG:  The heart rate on single channel ECG was predominantly between 100-150 BPM.    EEG Classification / Summary:  Abnormal EEG study  Severe attenuation, no appreciable cerebral activity    -----------------------------------------------------------------------------------------------------  Clinical Impression:  Severe diffuse cerebral dysfunction  Bradycardia at 1340pm, absence of activity on EKG lead after 1345pm  There were no epileptiform abnormalities recorded.    In absence of additional clinical concerns, recommend consideration for discontinuation of current EEG study     ------------------------------------------------------------------------------------------------------  Fam Storey MD  Epilepsy Fellow , [facility]  Department of Neurology, [facility]    [facility] EEG Reading Room Ph#: [phone]  Epilepsy Answering Service after 5PM and before 8:30AM: Ph#: [phone]

## 2022-11-23 NOTE — DIETITIAN INITIAL EVALUATION ADULT - REASON INDICATOR FOR ASSESSMENT
Nutrition consult for pressure injury stage 2 or >  Sources: EMR, interdisciplinary huddle - NP; pt intubated and unresponsive, inappropriate to interview at this time  Chart reviewed, events noted.

## 2022-11-23 NOTE — PROVIDER CONTACT NOTE (OTHER) - ACTION/TREATMENT ORDERED:
DAVID Bui and MD López both came to bedside to assess pt. Pt. started on vaso gtt. and double concentrated levo and amanda. Pt. needs titrated quickly, team aware.

## 2022-11-23 NOTE — PROGRESS NOTE ADULT - CONVERSATION DETAILS
Met with the patient's daughters (Daysi Holbrook and Evelyn Valencia) and Dr. Mac. The patient's daughters indicated that, after d/w the primary team and understanding it may take longer for the patient to become brain death, they had decided for a compassionate extubation, understanding that after extubation they patient may immediately or during the next few minutes or hours die. However, they believe that at this point, the patient may not have wanted to prolong this situation. They also agreed with DNR/I and a new MOLST was completed.

## 2022-11-23 NOTE — PROVIDER CONTACT NOTE (OTHER) - REASON
Hypotension
Patient was playing basketball and started to have right posterior ankle pain. No trauma or injury.   
pt.. tachycardic and hypertensive
change in neuro assessment
change in neuro exam

## 2022-11-23 NOTE — PROGRESS NOTE ADULT - THIS PATIENT HAS THE FOLLOWING CONDITION(S)/DIAGNOSES ON THIS ADMISSION:
Acute Respiratory Failure

## 2022-11-23 NOTE — DISCHARGE NOTE FOR THE EXPIRED PATIENT - HOSPITAL COURSE
89 year old man with history of hypothyroidism found at the bottom of stairs, brought to [facility], intubated, reportedly as diffuse SAH on CTH, neurosurgeon and radiologist concerned for aneurysmal SAH pattern, requested transfer. Reportedly per EMS, in [facility] ED, received 1U pRBC and 60g mannitol. Patient transferred to [facility] for further managment. Patient's family made decision for DNR/DNI. Family decided on compassionate extubation. Patient , time of death 0141 hrs on 22.  89 year old man with history of hypothyroidism found at the bottom of stairs, brought to [facility], intubated, reportedly as diffuse SAH on CTH, neurosurgeon and radiologist concerned for aneurysmal SAH pattern, requested transfer. Reportedly per EMS, in [facility] ED, received 1U pRBC and 60g mannitol. Patient transferred to [facility] for further management. Patient received 23.4% and further mannitol for poor neurological exam. Patient's family made decision for DNR/DNI. [facility] following for potential organ donation. Patient did not progress to brain death and family decided to pursue compassionate extubation with palliative medicine. Patient , time of death 1341 hrs on 22.

## 2022-11-23 NOTE — PROGRESS NOTE ADULT - ASSESSMENT
full note to f/u.   Met with the patient's daughters (Daysi Holbrook and Evelyn Valencia) and Dr. Mac. The patient's daughters indicated that, after d/w the primary team and understanding it may take longer for the patient to become brain death, they had decided for a compassionate extubation, understanding that after extubation they patient may immediately or during the next few minutes or hours die. However, they believe that at this point, the patient may not have wanted to prolong this situation. They also agreed with DNR/I and a new MOLST was completed.     With help from respiratory therapy, a respiratory distress trial was performed. After adjusting vent alarm settings, the patient was placed on CPAP 5 and FiO2 21%, after 3minutes, the patients O2 sat went down to 60% and without any espontaneus breaths. At that point, he was supported with Ambu bagging and then placed back on AC mode. Morphine 2mg IV x 1, Glyco 0.4mg IV x 1, and Ativan 0.5mg IV x 1 were given. After 15' of giving these medications, pressors were stopped and the patient was extubated.         García Jones MD  Associate Chief Geriatrics and Palliative Care (GaP) [facility] Director [place] Consult Service   , [employer] at [facility]      Please contact me via Teams from Monday through Friday between 9am-5pm. If not answering, please call the palliative care pager [phone]    After 5pm and on weekends, please see the contact information below:    In the event of newly developing, evolving, or worsening symptoms, please contact the Palliative Medicine team via pager (if the patient is at [facility] #[phone] or if the patient is at [facility] #[phone]) The Geriatric and Palliative Medicine service has coverage 24 hours a day/ 7 days a week to provide medical recommendations regarding symptom management needs via telephone 89 y/o male found at the bottom of staircase at home , transferred to [facility], intubated, [facility] with extensive facial and skull fractures with HH4 MF4 SAH. Geriatrics and Palliative Medicine (GaP) Team is seen for ACP and compassionate extubation.

## 2022-11-23 NOTE — PROGRESS NOTE ADULT - PROBLEM SELECTOR PLAN 1
With help from respiratory therapy, a respiratory distress trial was performed. After adjusting vent alarm settings, the patient was placed on CPAP 5 and FiO2 21%, after 3minutes, the patients O2 sat went down to 60% and without any espontaneus breaths. At that point, he was supported with Ambu bagging and then placed back on AC mode. Morphine 2mg IV x 1, Glyco 0.4mg IV x 1, and Ativan 0.5mg IV x 1 were given. After 15' of giving these medications, pressors were stopped and the patient was extubated.   Will continue Morphine 4mg IV q 2PRN respiratory distress  Will also add Ativan 0.5mg IV q 2PRN for intractable symptoms.   Glyco 0.4mg IV q 4PRN secretions.

## 2022-11-23 NOTE — DIETITIAN INITIAL EVALUATION ADULT - ADD RECOMMEND
1) Confirm goals of care regarding nutrition support, defer initiation of EN to team, tube feed recommendations provided above if needed, RD available to adjust formula, rate, and volume prn  2) Add a multivitamin and vitamin c daily to aid in wound healing and prevent micronutrient deficiencies  3) Monitor PO intake, diet tolerance, labs, hydration, GI function, skin integrity and wt trends

## 2022-11-23 NOTE — PROVIDER CONTACT NOTE (OTHER) - RECOMMENDATIONS
Titration of pressors
Vasopressin gtt.
follow up w Ct results; 23.4%
provider to bedside; surgical resident to bedside; mannitol IV;

## 2022-11-23 NOTE — DIETITIAN INITIAL EVALUATION ADULT - PERTINENT LABORATORY DATA
A1C: 6.2 % (11-20-22)  POCTs x 24 hrs: 266-350 <H>  Na: 158 <H>  BUN: 34 <H>  Creatinine: 2.4 <H>  Serum glucose: 327 <H>

## 2022-11-23 NOTE — PROGRESS NOTE ADULT - PROBLEM SELECTOR PLAN 4
See GOC above.         García Jones MD  Associate Chief Geriatrics and Palliative Care (GaP) [facility]   GaP Consult Service   , [employer] at [facility]      Please contact me via Teams from Monday through Friday between 9am-5pm. If not answering, please call the palliative care pager [phone]    After 5pm and on weekends, please see the contact information below:    In the event of newly developing, evolving, or worsening symptoms, please contact the Palliative Medicine team via pager (if the patient is at [facility] #[phone] or if the patient is at [facility] #[phone]) The Geriatric and Palliative Medicine service has coverage 24 hours a day/ 7 days a week to provide medical recommendations regarding symptom management needs via telephone

## 2022-11-23 NOTE — PROGRESS NOTE ADULT - SUBJECTIVE AND OBJECTIVE BOX
SUBJECTIVE AND OBJECTIVE:  Indication for Geriatrics and Palliative Care Services/INTERVAL HPI:    OVERNIGHT EVENTS:    DNR on chart:  Allergies    No Known Allergies    Intolerances    MEDICATIONS  (STANDING):  chlorhexidine 0.12% Liquid 15 milliLiter(s) Oral Mucosa every 12 hours  chlorhexidine 4% Liquid 1 Application(s) Topical at bedtime  dextrose 50% Injectable 50 milliLiter(s) IV Push every 15 minutes  insulin regular Infusion 3 Unit(s)/Hr (3 mL/Hr) IV Continuous <Continuous>  levETIRAcetam  Solution 250 milliGRAM(s) Oral two times a day  levothyroxine Infusion 20 MICROgram(s)/Hr (50 mL/Hr) IV Continuous <Continuous>  methylPREDNISolone sodium succinate IVPB 1000 milliGRAM(s) IV Intermittent daily  norepinephrine Infusion 1.2 MICROgram(s)/kG/Min (75.7 mL/Hr) IV Continuous <Continuous>  pantoprazole  Injectable 40 milliGRAM(s) IV Push daily  petrolatum Ophthalmic Ointment 1 Application(s) Both EYES every 8 hours  piperacillin/tazobactam IVPB.. 3.375 Gram(s) IV Intermittent every 12 hours  polyethylene glycol 3350 17 Gram(s) Oral every 12 hours  senna Syrup 15 milliLiter(s) Oral at bedtime  sodium chloride 0.45%. 1000 milliLiter(s) (50 mL/Hr) IV Continuous <Continuous>  vasopressin Infusion 0.04 Unit(s)/Min (6 mL/Hr) IV Continuous <Continuous>    MEDICATIONS  (PRN):  glycopyrrolate Injectable 0.4 milliGRAM(s) IV Push every 4 hours PRN secretions  LORazepam   Injectable 0.5 milliGRAM(s) IV Push every 1 hour PRN Anxiety, agitation or intractable respiratory distress  morphine  - Injectable 2 milliGRAM(s) IV Push every 1 hour PRN Moderate respiratory distress  morphine  - Injectable 4 milliGRAM(s) IV Push every 1 hour PRN Severe respiratory distress  morphine  - Injectable 2 milliGRAM(s) IV Push every 1 hour PRN Moderate Pain (4 - 6)  morphine  - Injectable 4 milliGRAM(s) IV Push every 1 hour PRN Severe Pain (7 - 10)      ITEMS UNCHECKED ARE NOT PRESENT    PRESENT SYMPTOMS: [ ]Unable to self-report - see [ ] CPOT [ ] PAINADS [ ] RDOS  Source if other than patient:  [ ]Family   [ ]Team     Pain:  [ ]yes [ ]no  QOL impact -   Location -                    Aggravating factors -  Quality -  Radiation -  Timing-  Severity (0-10 scale):  Minimal acceptable level (0-10 scale):     CPOT:    https://www.[facility].org/getattachment/[email or web address]/Critical-Care-Pain-Observation-Tool-(CPOT)    PAINAD Score: See PAINAD tool and score below       Dyspnea:                           [ ]Mild [ ]Moderate [ ]Severe    RDOS: See RDOS tool and score below   0 to 2  minimal or no respiratory distress   3  mild distress  4 to 6 moderate distress  >7 severe distress      Anxiety:                             [ ]Mild [ ]Moderate [ ]Severe  Fatigue:                             [ ]Mild [ ]Moderate [ ]Severe  Nausea:                             [ ]Mild [ ]Moderate [ ]Severe  Loss of appetite:              [ ]Mild [ ]Moderate [ ]Severe  Constipation:                    [ ]Mild [ ]Moderate [ ]Severe    PCSSQ[Palliative Care Spiritual Screening Question]   Severity (0-10):  Score of 4 or > indicate consideration of Chaplaincy referral.  Chaplaincy Referral: [ ] yes [ ] refused [ ] following [ ] Deferred     Caregiver [place]? : [ ] yes [ ] no [ ] Deferred [ ] Declined             Social work referral [ ] Patient & Family Centered Care Referral [ ]     Anticipatory Grief present?:  [ ] yes [ ] no  [ ] Deferred                  Social work referral [ ] Chaplaincy Referral [ ]    		  Other Symptoms:  [ ]All other review of systems negative     Palliative Performance Status Version 2:   See PPSv2 tool and score below         PHYSICAL EXAM:  Vital Signs Last 24 Hrs  T(C): 36.3 (23 Nov 2022 11:00), Max: 37.8 (22 Nov 2022 13:00)  T(F): 97.3 (23 Nov 2022 11:00), Max: 100 (22 Nov 2022 13:00)  HR: 88 (23 Nov 2022 12:15) (86 - 149)  BP: 125/73 (23 Nov 2022 12:00) (95/61 - 185/131)  BP(mean): 89 (23 Nov 2022 12:00) (73 - 148)  RR: 16 (23 Nov 2022 12:15) (16 - 23)  SpO2: 96% (23 Nov 2022 12:15) (94% - 99%)    Parameters below as of 23 Nov 2022 07:00  Patient On (Oxygen Delivery Method): ventilator     I&O's Summary    22 Nov 2022 07:01  -  23 Nov 2022 07:00  --------------------------------------------------------  IN: 6985.4 mL / OUT: 2905 mL / NET: 4080.4 mL    23 Nov 2022 07:01  -  23 Nov 2022 12:56  --------------------------------------------------------  IN: 593.4 mL / OUT: 250 mL / NET: 343.4 mL       GENERAL: [ ]Cachexia    [ ]Alert  [ ]Oriented x   [ ]Lethargic  [ ]Unarousable  [ ]Verbal  [ ]Non-Verbal  Behavioral:   [ ]Anxiety  [ ]Delirium [ ]Agitation [ ]Other  HEENT:  [ ]Normal   [ ]Dry mouth   [ ]ET Tube/Trach  [ ]Oral lesions  PULMONARY:   [ ]Clear [ ]Tachypnea  [ ]Audible excessive secretions   [ ]Rhonchi        [ ]Right [ ]Left [ ]Bilateral  [ ]Crackles        [ ]Right [ ]Left [ ]Bilateral  [ ]Wheezing     [ ]Right [ ]Left [ ]Bilateral  [ ]Diminished BS [ ] Right [ ]Left [ ]Bilateral  CARDIOVASCULAR:    [ ]Regular [ ]Irregular [ ]Tachy  [ ]Reynaldo [ ]Murmur [ ]Other  GASTROINTESTINAL:  [ ]Soft  [ ]Distended   [ ]+BS  [ ]Non tender [ ]Tender  [ ]Other [ ]PEG [ ]OGT/ NGT   Last BM:   GENITOURINARY:  [ ]Normal [ ]Incontinent   [ ]Oliguria/Anuria   [ ]Barnes  MUSCULOSKELETAL:   [ ]Normal   [ ]Weakness  [ ]Bed/Wheelchair bound [ ]Edema  NEUROLOGIC:   [ ]No focal deficits  [ ] Cognitive impairment  [ ] Dysphagia [ ]Dysarthria [ ] Paresis [ ]Other   SKIN:   [ ]Normal  [ ]Rash  [ ]Other  [ ]Pressure ulcer(s) [ ]y [ ]n present on admission    CRITICAL CARE:  [ ]Shock Present  [ ]Septic [ ]Cardiogenic [ ]Neurologic [ ]Hypovolemic  [ ]Vasopressors [ ]Inotropes  [ ]Respiratory failure present [ ]Mechanical Ventilation [ ]Non-invasive ventilatory support [ ]High-Flow Mode: AC/ CMV (Assist Control/ Continuous Mandatory Ventilation), RR (machine): 20, TV (machine): 480, FiO2: 40, PEEP: 5, ITime: 1, MAP: 9, PIP: 20  [ ]Acute  [ ]Chronic [ ]Hypoxic  [ ]Hypercarbic [ ]Other  [ ]Other organ failure     LABS:                        9.0    26.24 )-----------( 138      ( 23 Nov 2022 11:23 )             30.7   11-23    159<H>  |  128<H>  |  35<H>  ----------------------------<  201<H>  3.9   |  23  |  2.34<H>    Ca    8.2<L>      23 Nov 2022 11:23  Phos  3.2     11-23  Mg     2.2     11-23    TPro  5.6<L>  /  Alb  2.6<L>  /  TBili  0.5  /  DBili  0.1  /  AST  64<H>  /  ALT  47<H>  /  AlkPhos  88  11-23  PT/INR - ( 23 Nov 2022 11:23 )   PT: 17.6 sec;   INR: 1.51 ratio         PTT - ( 23 Nov 2022 11:23 )  PTT:29.3 sec    Urinalysis Basic - ( 22 Nov 2022 17:11 )    Color: Other / Appearance: Slightly Turbid / SG: >1.050 / pH: x  Gluc: x / Ketone: Negative  / Bili: Negative / Urobili: Negative   Blood: x / Protein: 100 mg/dL / Nitrite: Negative   Leuk Esterase: Small / RBC: 531 /hpf / WBC 86 /HPF   Sq Epi: x / Non Sq Epi: 6 / Bacteria: Negative      RADIOLOGY & ADDITIONAL STUDIES:    Protein Calorie Malnutrition Present: [ ]mild [ ]moderate [ ]severe [ ]underweight [ ]morbid obesity  https://www.andeal.org/vault/9285/web/files/ONC/Table_Clinical%20Characteristics%20to%20Document%20Malnutrition-White%20JV%20et%20al%202012.pdf      Weight (kg): 67.3 (11-20-22 @ 20:53)    [ ]PPSV2 < or = 30%  [ ]significant weight loss [ ]poor nutritional intake [ ]anasarca[ ]Artificial Nutrition    Other REFERRALS:  [ ]Hospice  [ ]Child Life  [ ]Social Work  [ ]Case management [ ]Holistic Therapy     Goals of Care Document: SUBJECTIVE AND OBJECTIVE: Unresponsive and on mechanical ventilation.   Indication for Geriatrics and Palliative Care Services/INTERVAL HPI: Compassionate extubation. No new events.     OVERNIGHT EVENTS:    DNR on chart:  Allergies    No Known Allergies    Intolerances    MEDICATIONS  (STANDING):  chlorhexidine 0.12% Liquid 15 milliLiter(s) Oral Mucosa every 12 hours  chlorhexidine 4% Liquid 1 Application(s) Topical at bedtime  dextrose 50% Injectable 50 milliLiter(s) IV Push every 15 minutes  insulin regular Infusion 3 Unit(s)/Hr (3 mL/Hr) IV Continuous <Continuous>  levETIRAcetam  Solution 250 milliGRAM(s) Oral two times a day  levothyroxine Infusion 20 MICROgram(s)/Hr (50 mL/Hr) IV Continuous <Continuous>  methylPREDNISolone sodium succinate IVPB 1000 milliGRAM(s) IV Intermittent daily  norepinephrine Infusion 1.2 MICROgram(s)/kG/Min (75.7 mL/Hr) IV Continuous <Continuous>  pantoprazole  Injectable 40 milliGRAM(s) IV Push daily  petrolatum Ophthalmic Ointment 1 Application(s) Both EYES every 8 hours  piperacillin/tazobactam IVPB.. 3.375 Gram(s) IV Intermittent every 12 hours  polyethylene glycol 3350 17 Gram(s) Oral every 12 hours  senna Syrup 15 milliLiter(s) Oral at bedtime  sodium chloride 0.45%. 1000 milliLiter(s) (50 mL/Hr) IV Continuous <Continuous>  vasopressin Infusion 0.04 Unit(s)/Min (6 mL/Hr) IV Continuous <Continuous>    MEDICATIONS  (PRN):  glycopyrrolate Injectable 0.4 milliGRAM(s) IV Push every 4 hours PRN secretions  LORazepam   Injectable 0.5 milliGRAM(s) IV Push every 1 hour PRN Anxiety, agitation or intractable respiratory distress  morphine  - Injectable 2 milliGRAM(s) IV Push every 1 hour PRN Moderate respiratory distress  morphine  - Injectable 4 milliGRAM(s) IV Push every 1 hour PRN Severe respiratory distress  morphine  - Injectable 2 milliGRAM(s) IV Push every 1 hour PRN Moderate Pain (4 - 6)  morphine  - Injectable 4 milliGRAM(s) IV Push every 1 hour PRN Severe Pain (7 - 10)      ITEMS UNCHECKED ARE NOT PRESENT    PRESENT SYMPTOMS: [x ]Unable to self-report - see [ ] CPOT [ ] PAINADS [ ] RDOS  Source if other than patient:  [ ]Family   [ ]Team     Pain:  [ ]yes [ x]no  QOL impact -   Location -                    Aggravating factors -  Quality -  Radiation -  Timing-  Severity (0-10 scale):  Minimal acceptable level (0-10 scale):     CPOT:    https://www.[facility].org/getattachment/[email or web address]/Critical-Care-Pain-Observation-Tool-(CPOT)    PAINAD Score: See PAINAD tool and score below       Dyspnea:                           [ ]Mild [ ]Moderate [ ]Severe    RDOS: See RDOS tool and score below   0 to 2  minimal or no respiratory distress   3  mild distress  4 to 6 moderate distress  >7 severe distress      Anxiety:                             [ ]Mild [ ]Moderate [ ]Severe  Fatigue:                             [ ]Mild [ ]Moderate [ ]Severe  Nausea:                             [ ]Mild [ ]Moderate [ ]Severe  Loss of appetite:              [ ]Mild [ ]Moderate [ ]Severe  Constipation:                    [ ]Mild [ ]Moderate [ ]Severe    PCSSQ[Palliative Care Spiritual Screening Question]   Severity (0-10):  Score of 4 or > indicate consideration of Chaplaincy referral.  Chaplaincy Referral: [ ] yes [ ] refused [ ] following [ ] Deferred     Caregiver [place]? : [ ] yes [ ] no [ ] Deferred [ ] Declined             Social work referral [ ] Patient & Family Centered Care Referral [ ]     Anticipatory Grief present?:  [ ] yes [ ] no  [ ] Deferred                  Social work referral [ ] Chaplaincy Referral [ ]    		  Other Symptoms:  [ ]All other review of systems negative     Palliative Performance Status Version 2:   See PPSv2 tool and score below         PHYSICAL EXAM:  Vital Signs Last 24 Hrs  T(C): 36.3 (23 Nov 2022 11:00), Max: 37.8 (22 Nov 2022 13:00)  T(F): 97.3 (23 Nov 2022 11:00), Max: 100 (22 Nov 2022 13:00)  HR: 88 (23 Nov 2022 12:15) (86 - 149)  BP: 125/73 (23 Nov 2022 12:00) (95/61 - 185/131)  BP(mean): 89 (23 Nov 2022 12:00) (73 - 148)  RR: 16 (23 Nov 2022 12:15) (16 - 23)  SpO2: 96% (23 Nov 2022 12:15) (94% - 99%)    Parameters below as of 23 Nov 2022 07:00  Patient On (Oxygen Delivery Method): ventilator     I&O's Summary    22 Nov 2022 07:01  -  23 Nov 2022 07:00  --------------------------------------------------------  IN: 6985.4 mL / OUT: 2905 mL / NET: 4080.4 mL    23 Nov 2022 07:01  -  23 Nov 2022 12:56  --------------------------------------------------------  IN: 593.4 mL / OUT: 250 mL / NET: 343.4 mL       GENERAL: [ ]Cachexia    [ ]Alert  [ ]Oriented x   [ ]Lethargic  [x ]Unarousable  [ ]Verbal  [ ]Non-Verbal  Behavioral:   [ ]Anxiety  [ ]Delirium [ ]Agitation [ ]Other  HEENT:  [ ]Normal   [ ]Dry mouth   [x ]ET Tube/Trach  [ ]Oral lesions  PULMONARY:   [ ]Clear [ ]Tachypnea  [ ]Audible excessive secretions   [ ]Rhonchi        [ ]Right [ ]Left [ ]Bilateral  [ ]Crackles        [ ]Right [ ]Left [ ]Bilateral  [ ]Wheezing     [ ]Right [ ]Left [ ]Bilateral  [ ]Diminished BS [ ] Right [ ]Left [ ]Bilateral  CARDIOVASCULAR:    [x ]Regular [ ]Irregular [ ]Tachy  [ ]Reynaldo [ ]Murmur [ ]Other  GASTROINTESTINAL:  [ ]Soft  [ ]Distended   [ ]+BS  [ ]Non tender [ ]Tender  [ ]Other [ ]PEG [ ]OGT/ NGT   Last BM: 11/23  GENITOURINARY:  [ ]Normal [ ]Incontinent   [ ]Oliguria/Anuria   [x ]Barnes  MUSCULOSKELETAL:   [ ]Normal   [ ]Weakness  [x ]Bed/Wheelchair bound [ ]Edema  NEUROLOGIC:   [ ]No focal deficits  [x ] Cognitive impairment  [ ] Dysphagia [ ]Dysarthria [ ] Paresis [ ]Other   SKIN:   [x ]Normal  [ ]Rash  [ ]Other  [ ]Pressure ulcer(s) [ ]y [ ]n present on admission    CRITICAL CARE:  [ ]Shock Present  [ ]Septic [ ]Cardiogenic [ ]Neurologic [ ]Hypovolemic  [ ]Vasopressors [ ]Inotropes  [ ]Respiratory failure present [ ]Mechanical Ventilation [ ]Non-invasive ventilatory support [ ]High-Flow Mode: AC/ CMV (Assist Control/ Continuous Mandatory Ventilation), RR (machine): 20, TV (machine): 480, FiO2: 40, PEEP: 5, ITime: 1, MAP: 9, PIP: 20  [ ]Acute  [ ]Chronic [ ]Hypoxic  [ ]Hypercarbic [ ]Other  [ ]Other organ failure     LABS:                        9.0    26.24 )-----------( 138      ( 23 Nov 2022 11:23 )             30.7   11-23    159<H>  |  128<H>  |  35<H>  ----------------------------<  201<H>  3.9   |  23  |  2.34<H>    Ca    8.2<L>      23 Nov 2022 11:23  Phos  3.2     11-23  Mg     2.2     11-23    TPro  5.6<L>  /  Alb  2.6<L>  /  TBili  0.5  /  DBili  0.1  /  AST  64<H>  /  ALT  47<H>  /  AlkPhos  88  11-23  PT/INR - ( 23 Nov 2022 11:23 )   PT: 17.6 sec;   INR: 1.51 ratio         PTT - ( 23 Nov 2022 11:23 )  PTT:29.3 sec    Urinalysis Basic - ( 22 Nov 2022 17:11 )    Color: Other / Appearance: Slightly Turbid / SG: >1.050 / pH: x  Gluc: x / Ketone: Negative  / Bili: Negative / Urobili: Negative   Blood: x / Protein: 100 mg/dL / Nitrite: Negative   Leuk Esterase: Small / RBC: 531 /hpf / WBC 86 /HPF   Sq Epi: x / Non Sq Epi: 6 / Bacteria: Negative      RADIOLOGY & ADDITIONAL STUDIES:  < from: CT Angio Head w/ IV Cont (11.21.22 @ 00:35) >  IMPRESSION:    1.  Brain:  Extensive posttraumatic injury includes multiple facial   fractures and calvarial fractures. Extensive intracranial hemorrhage   includes throughout much of the anterior subarachnoid space extending to   the ventricles and basal cisterns, right-sided subdural space and   hemorrhagic brain contusions. Posttraumatic intracranial emphysema.    2.  Right carotid system:    Luminal irregularity of the innominate   artery reflects atherosclerotic plaque and possible superimposed short   segment arterial dissection. Atherosclerotic plaque in the common carotid   artery and carotid bulb without hemodynamically significant stenosis    3   Left carotid system:     Atherosclerotic plaque without    hemodynamically significant stenosis.    4.   Vertebral circulation:    Patent.    5.  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    6.  Posterior intracranial circulation:    Unremarkable.    7. C1 fracture. Anterior subluxation of the cervical spine with respect   to the foramen magnum. Cervical degenerative disc disease.    8. Endotracheal tube terminates at the janenth directed to the right.   Proximal repositioning 1 to 2 cm recommended.    --- End of Report ---        < end of copied text >    Protein Calorie Malnutrition Present: [ ]mild [ ]moderate [ ]severe [ ]underweight [ ]morbid obesity  https://www.andeal.org/vault/2440/web/files/ONC/Table_Clinical%20Characteristics%20to%20Document%20Malnutrition-White%20JV%20et%20al%202012.pdf      Weight (kg): 67.3 (11-20-22 @ 20:53)    [ ]PPSV2 < or = 30%  [ ]significant weight loss [ ]poor nutritional intake [ ]anasarca[ ]Artificial Nutrition    Other REFERRALS:  [ ]Hospice  [ ]Child Life  [ ]Social Work  [ ]Case management [ ]Holistic Therapy     Goals of Care Document:

## 2022-11-23 NOTE — PROGRESS NOTE ADULT - SUBJECTIVE AND OBJECTIVE BOX
SUMMARY: Reportedly 88yo man found at the bottom of stairs, brought to [facility], intubated, reportedly as SAH on CTH, neurosurgeon and radiologist concerned for aneurysmal SAH pattern, requested transfer. Daughter on her way to [facility] now, [place] [phone]. Reportedly per EMS, in [facility] ED, received 1U pRBC and 60g mannitol.     overnight events 11/21 :got fentanyl overnight, worsening exam but still breathing spontaneously when vent settings decreased. Was hypothermic yesterday. Per discussion w live on, family allowed a period of 24 h to evaluate for progression to brain death, after which pt would transition to PCU    Overnight events 11/22: patient was very hypotensive, MAP in the 30s. Pressor requirements tripled, patient was started on vaso and a bolus of D5 was given as per live on. Family aware. Currently on 3 pressors. Examination remains poor. still overbreathing, hyperglycemia, slight met acidosis on abg    REVIEW OF SYSTEMS: [x] Unable to Assess due to neurologic exam   [ ] All ROS addressed below are non-contributory, except:  Neuro: [ ] Headache [ ] Back pain [ ] Numbness [ ] Weakness [ ] Ataxia [ ] Dizziness [ ] Aphasia [ ] Dysarthria [ ] Visual disturbance  Resp: [ ] Shortness of breath/dyspnea [ ] Orthopnea [ ] Cough  CV: [ ] Chest pain [ ] Palpitation [ ] Lightheadedness [ ] Syncope  Renal: [ ] Thirst [ ] Edema  GI: [ ] Nausea [ ] Emesis [ ] Abdominal pain [ ] Constipation [ ] Diarrhea  Hem: [ ] Hematemesis [ ] bBright red blood per rectum  ID: [ ] Fever [ ] Chills [ ] Dysuria  ENT: [ ] Rhinorrhea    chlorhexidine 0.12% Liquid 15 milliLiter(s) Oral Mucosa every 12 hours  chlorhexidine 4% Liquid 1 Application(s) Topical at bedtime  levETIRAcetam  Solution 250 milliGRAM(s) Oral two times a day  levothyroxine 125 MICROGram(s) Enteral Tube daily  norepinephrine Infusion 0.05 MICROgram(s)/kG/Min IV Continuous <Continuous>  pantoprazole  Injectable 40 milliGRAM(s) IV Push daily  petrolatum Ophthalmic Ointment 1 Application(s) Both EYES every 8 hours  polyethylene glycol 3350 17 Gram(s) Oral every 12 hours  senna Syrup 15 milliLiter(s) Oral at bedtime  sodium chloride 0.9%. 1000 milliLiter(s) IV Continuous <Continuous>  Mode: AC/ CMV (Assist Control/ Continuous Mandatory Ventilation), RR (machine): 18, TV (machine): 450, FiO2: 40, PEEP: 5, ITime: 1, MAP: 8, PIP: 14  IMAGING/DATA: [x] Reviewed      IVF FLUIDS/MEDICATIONS: [x] Reviewed    ALLERGIES: Allergies    Intolerances    DEVICES:   [] Restraints [x] PIVs [x] ET tube [] central line [] PICC [x] arterial line [x] nash [x] NGT/OGT [] EVD [] LD [] JAYDON/HMV [] LiCOX [] ICP monitor [] Trach [] PEG [] Chest Tube [] other:    EXAMINATION:  General: No acute distress  HEENT: Anicteric sclerae, right eye proptotic and ecchymotic.   Cardiac: [id number]  Lungs: Clear  Abdomen: Soft, non-tender, +BS  Extremities: No c/c/e  Neurologic: b/l periorbital bruising, in C collar, pupils 4mm NR, no EO to stim, no cough, no gag, no dolls, intermittently overbreathing the vent, no mov to nox x4 SUMMARY: Reportedly 89yo man found at the bottom of stairs, brought to [facility], intubated, reportedly as SAH on CTH, neurosurgeon and radiologist concerned for aneurysmal SAH pattern, requested transfer. Daughter on her way to [facility] now, [place] [phone]. Reportedly per EMS, in [facility] ED, received 1U pRBC and 60g mannitol.     overnight events 11/21 :got fentanyl overnight, worsening exam but still breathing spontaneously when vent settings decreased. Was hypothermic yesterday. Per discussion w live on, family allowed a period of 24 h to evaluate for progression to brain death, after which pt would transition to PCU    Overnight events 11/22: patient was very hypotensive, MAP in the 30s. Pressor requirements tripled, but decreasing doses, patient was started on vaso and a bolus of D5 was given as per live on. Family aware. Currently on 3 pressors. Examination remains poor. still overbreathing, hyperglycemia, slight met acidosis on abg    REVIEW OF SYSTEMS: [x] Unable to Assess due to neurologic exam   [ ] All ROS addressed below are non-contributory, except:  Neuro: [ ] Headache [ ] Back pain [ ] Numbness [ ] Weakness [ ] Ataxia [ ] Dizziness [ ] Aphasia [ ] Dysarthria [ ] Visual disturbance  Resp: [ ] Shortness of breath/dyspnea [ ] Orthopnea [ ] Cough  CV: [ ] Chest pain [ ] Palpitation [ ] Lightheadedness [ ] Syncope  Renal: [ ] Thirst [ ] Edema  GI: [ ] Nausea [ ] Emesis [ ] Abdominal pain [ ] Constipation [ ] Diarrhea  Hem: [ ] Hematemesis [ ] bBright red blood per rectum  ID: [ ] Fever [ ] Chills [ ] Dysuria  ENT: [ ] Rhinorrhea    chlorhexidine 0.12% Liquid 15 milliLiter(s) Oral Mucosa every 12 hours  chlorhexidine 4% Liquid 1 Application(s) Topical at bedtime  levETIRAcetam  Solution 250 milliGRAM(s) Oral two times a day  levothyroxine 125 MICROGram(s) Enteral Tube daily  norepinephrine Infusion 0.05 MICROgram(s)/kG/Min IV Continuous <Continuous>  pantoprazole  Injectable 40 milliGRAM(s) IV Push daily  petrolatum Ophthalmic Ointment 1 Application(s) Both EYES every 8 hours  polyethylene glycol 3350 17 Gram(s) Oral every 12 hours  senna Syrup 15 milliLiter(s) Oral at bedtime  sodium chloride 0.9%. 1000 milliLiter(s) IV Continuous <Continuous>  Mode: AC/ CMV (Assist Control/ Continuous Mandatory Ventilation), RR (machine): 18, TV (machine): 450, FiO2: 40, PEEP: 5, ITime: 1, MAP: 8, PIP: 14  IMAGING/DATA: [x] Reviewed      IVF FLUIDS/MEDICATIONS: [x] Reviewed    ALLERGIES: Allergies    Intolerances    DEVICES:   [] Restraints [x] PIVs [x] ET tube [] central line [] PICC [x] arterial line [x] nash [x] NGT/OGT [] EVD [] LD [] JAYDON/HMV [] LiCOX [] ICP monitor [] Trach [] PEG [] Chest Tube [] other:    EXAMINATION:  General: No acute distress  HEENT: Anicteric sclerae, right eye proptotic and ecchymotic.   Cardiac: [id number]  Lungs: Clear  Abdomen: Soft, non-tender, +BS  Extremities: No c/c/e  Neurologic: b/l periorbital bruising, in C collar, pupils 4mm NR, no EO to stim, no cough, no gag, no dolls, intermittently overbreathing the vent, no mov to nox BUE, TF on BLE

## 2022-11-23 NOTE — DIETITIAN INITIAL EVALUATION ADULT - ENERGY INTAKE
Currently NPO x 3 days, awaiting goals of care, provided enteral nutrition recommendations below if needed. NPO

## 2022-11-23 NOTE — PROVIDER CONTACT NOTE (OTHER) - ASSESSMENT
L pupil is 3RF and R pupil is 4RF
bilateral pupils are dilated and fixed; LUE is extending to pain; RUE is withdrawing to pain; bilateral LE triple flexing to pain; pt is hypertensive and is breathing with accessory muscles. O2 stat is 100%
Pt. pupils fixed and unreactive, Pt. becoming hypotensive with a MAP of 38, regardless of large titration of pressors
Pt. tachycardic, hypertensive, and spo2 91% pt. pressor requirement decreased. MD aware.

## 2022-11-23 NOTE — DIETITIAN INITIAL EVALUATION ADULT - SIGNS/SYMPTOMS
MD Notification    Notified Person: MD    Notified Person Name: Dr Millard    Notification Date/Time: 07/25; 0618    Notification Interaction: [employer] web paging    Purpose of Notification: W.,R 721  Pt latest /159. No PRNs, do you want to treat?    Priscila RN  #[id number]    Orders Received: Hydralazine injection 10mg q4h PRN for SBP > 180 & Labetalol injection 10mg q2h for SBP > 180.     Comments:     as evidenced by DTI, SAH

## 2022-11-24 DIAGNOSIS — Z51.5 ENCOUNTER FOR PALLIATIVE CARE: ICD-10-CM

## 2022-11-24 PROCEDURE — 84484 ASSAY OF TROPONIN QUANT: CPT

## 2022-11-24 PROCEDURE — 94003 VENT MGMT INPAT SUBQ DAY: CPT

## 2022-11-24 PROCEDURE — 95714 VEEG EA 12-26 HR UNMNTR: CPT

## 2022-11-24 PROCEDURE — 82977 ASSAY OF GGT: CPT

## 2022-11-24 PROCEDURE — 84295 ASSAY OF SERUM SODIUM: CPT

## 2022-11-24 PROCEDURE — 76700 US EXAM ABDOM COMPLETE: CPT

## 2022-11-24 PROCEDURE — 86850 RBC ANTIBODY SCREEN: CPT

## 2022-11-24 PROCEDURE — 83615 LACTATE (LD) (LDH) ENZYME: CPT

## 2022-11-24 PROCEDURE — C9254: CPT

## 2022-11-24 PROCEDURE — 85384 FIBRINOGEN ACTIVITY: CPT

## 2022-11-24 PROCEDURE — 80053 COMPREHEN METABOLIC PANEL: CPT

## 2022-11-24 PROCEDURE — 84443 ASSAY THYROID STIM HORMONE: CPT

## 2022-11-24 PROCEDURE — 82962 GLUCOSE BLOOD TEST: CPT

## 2022-11-24 PROCEDURE — 80048 BASIC METABOLIC PNL TOTAL CA: CPT

## 2022-11-24 PROCEDURE — G0103: CPT

## 2022-11-24 PROCEDURE — 82330 ASSAY OF CALCIUM: CPT

## 2022-11-24 PROCEDURE — 70450 CT HEAD/BRAIN W/O DYE: CPT

## 2022-11-24 PROCEDURE — 82947 ASSAY GLUCOSE BLOOD QUANT: CPT

## 2022-11-24 PROCEDURE — 84132 ASSAY OF SERUM POTASSIUM: CPT

## 2022-11-24 PROCEDURE — 83930 ASSAY OF BLOOD OSMOLALITY: CPT

## 2022-11-24 PROCEDURE — 84480 ASSAY TRIIODOTHYRONINE (T3): CPT

## 2022-11-24 PROCEDURE — 83605 ASSAY OF LACTIC ACID: CPT

## 2022-11-24 PROCEDURE — 71260 CT THORAX DX C+: CPT

## 2022-11-24 PROCEDURE — 85014 HEMATOCRIT: CPT

## 2022-11-24 PROCEDURE — 80061 LIPID PANEL: CPT

## 2022-11-24 PROCEDURE — 81001 URINALYSIS AUTO W/SCOPE: CPT

## 2022-11-24 PROCEDURE — U0003: CPT

## 2022-11-24 PROCEDURE — 85730 THROMBOPLASTIN TIME PARTIAL: CPT

## 2022-11-24 PROCEDURE — 70496 CT ANGIOGRAPHY HEAD: CPT

## 2022-11-24 PROCEDURE — 93970 EXTREMITY STUDY: CPT

## 2022-11-24 PROCEDURE — 86901 BLOOD TYPING SEROLOGIC RH(D): CPT

## 2022-11-24 PROCEDURE — 95700 EEG CONT REC W/VID EEG TECH: CPT

## 2022-11-24 PROCEDURE — 70498 CT ANGIOGRAPHY NECK: CPT

## 2022-11-24 PROCEDURE — 82435 ASSAY OF BLOOD CHLORIDE: CPT

## 2022-11-24 PROCEDURE — U0005: CPT

## 2022-11-24 PROCEDURE — 83036 HEMOGLOBIN GLYCOSYLATED A1C: CPT

## 2022-11-24 PROCEDURE — 74177 CT ABD & PELVIS W/CONTRAST: CPT

## 2022-11-24 PROCEDURE — 83735 ASSAY OF MAGNESIUM: CPT

## 2022-11-24 PROCEDURE — 85027 COMPLETE CBC AUTOMATED: CPT

## 2022-11-24 PROCEDURE — 84436 ASSAY OF TOTAL THYROXINE: CPT

## 2022-11-24 PROCEDURE — 82248 BILIRUBIN DIRECT: CPT

## 2022-11-24 PROCEDURE — 85018 HEMOGLOBIN: CPT

## 2022-11-24 PROCEDURE — 71045 X-RAY EXAM CHEST 1 VIEW: CPT

## 2022-11-24 PROCEDURE — 36415 COLL VENOUS BLD VENIPUNCTURE: CPT

## 2022-11-24 PROCEDURE — 86900 BLOOD TYPING SEROLOGIC ABO: CPT

## 2022-11-24 PROCEDURE — 95711 VEEG 2-12 HR UNMONITORED: CPT

## 2022-11-24 PROCEDURE — 93005 ELECTROCARDIOGRAM TRACING: CPT

## 2022-11-24 PROCEDURE — 84100 ASSAY OF PHOSPHORUS: CPT

## 2022-11-24 PROCEDURE — 85610 PROTHROMBIN TIME: CPT

## 2022-11-24 PROCEDURE — 82803 BLOOD GASES ANY COMBINATION: CPT

## 2023-09-25 RX ORDER — LEVOTHYROXINE SODIUM 125 MCG
1 TABLET ORAL
Qty: 0 | Refills: 0 | DISCHARGE

## 2023-09-25 RX ORDER — ROSUVASTATIN CALCIUM 5 MG/1
1 TABLET ORAL
Qty: 0 | Refills: 0 | DISCHARGE

## 2023-09-25 RX ORDER — OMEPRAZOLE 10 MG/1
1 CAPSULE, DELAYED RELEASE ORAL
Qty: 0 | Refills: 0 | DISCHARGE